# Patient Record
Sex: FEMALE | Race: WHITE | URBAN - METROPOLITAN AREA
[De-identification: names, ages, dates, MRNs, and addresses within clinical notes are randomized per-mention and may not be internally consistent; named-entity substitution may affect disease eponyms.]

---

## 2021-12-06 ENCOUNTER — HOSPITAL ENCOUNTER (OUTPATIENT)
Age: 26
Discharge: HOME | End: 2021-12-06
Attending: OBSTETRICS & GYNECOLOGY | Admitting: OBSTETRICS & GYNECOLOGY
Payer: MEDICARE

## 2021-12-06 VITALS
RESPIRATION RATE: 16 BRPM | TEMPERATURE: 98.1 F | HEART RATE: 76 BPM | SYSTOLIC BLOOD PRESSURE: 119 MMHG | DIASTOLIC BLOOD PRESSURE: 67 MMHG

## 2021-12-06 PROBLEM — Z34.93 NORMAL PREGNANCY IN THIRD TRIMESTER: Status: ACTIVE | Noted: 2021-12-06

## 2021-12-06 PROCEDURE — 99211 OFF/OP EST MAY X REQ PHY/QHP: CPT

## 2021-12-06 NOTE — PROGRESS NOTES
Patient presents to unit with co feeling her abd \"drop\" when she awakened this morning and decreased fetal movement since she awakened. At this time, patient perceives active fetal movement. Denies bleeding or leaking fluid or cramping. Plan of care reviewed.

## 2021-12-06 NOTE — H&P
Subjective:      Leonela Levy is an 31 y.o. female at 39 and 0/7 weeks gestation presenting with   Chief Complaint   Patient presents with    Decreased Fetal Movement   . She is also complaining of contractions every a few minutes lasting few seconds. Other associated symptoms include low back pain. Fetal Movement: normal.  No past medical history on file. Review of Systems  Pertinent items are noted in HPI. Objective:      /67   Pulse 76   Temp 98.1 °F (36.7 °C) (Oral)   Resp 16   Blood pressure 119/67, pulse 76, temperature 98.1 °F (36.7 °C), temperature source Oral, resp. rate 16. General:   alert, appears stated age and cooperative   Cervix:   closed. FHT:   135 BPM     Lab Review    CBC: No results found for: WBC, RBC, HGB, HCT, MCV, MCH, MCHC, RDW, PLT, MPV  CMP:  No results found for: NA, K, CL, CO2, BUN, CREATININE, GFRAA, AGRATIO, LABGLOM, GLUCOSE, PROT, LABALBU, CALCIUM, BILITOT, ALKPHOS, AST, ALT  U/A:  No results found for: NITRITE, COLORU, PHUR, LABCAST, WBCUA, RBCUA, MUCUS, TRICHOMONAS, YEAST, BACTERIA, CLARITYU, SPECGRAV, LEUKOCYTESUR, UROBILINOGEN, BILIRUBINUR, BLOODU, GLUCOSEU, AMORPHOUS       Assessment:   False labor    Plan:      Monitored for contractions - findings: uterine irritability and reactive strip. Orders: no treatment necessary.      Brianna Workman MD,MD,12/6/2021 5:10 PM

## 2022-01-04 ENCOUNTER — APPOINTMENT (OUTPATIENT)
Dept: LABOR AND DELIVERY | Age: 27
End: 2022-01-04
Payer: MEDICARE

## 2022-01-04 ENCOUNTER — HOSPITAL ENCOUNTER (OUTPATIENT)
Age: 27
Discharge: HOME | End: 2022-01-04
Attending: OBSTETRICS & GYNECOLOGY | Admitting: OBSTETRICS & GYNECOLOGY
Payer: MEDICARE

## 2022-01-04 VITALS
RESPIRATION RATE: 16 BRPM | DIASTOLIC BLOOD PRESSURE: 67 MMHG | TEMPERATURE: 98 F | SYSTOLIC BLOOD PRESSURE: 117 MMHG | HEART RATE: 82 BPM

## 2022-01-04 PROCEDURE — 59025 FETAL NON-STRESS TEST: CPT

## 2022-01-04 NOTE — PROGRESS NOTES
40w1d    Patient arrived to the unit for a scheduled NST for post dates. Patient denies leaking of fluids or bleeding and perceives fetal movement. Placed on EFM. Oriented to TR1. Call light in reach.

## 2022-01-11 ENCOUNTER — APPOINTMENT (OUTPATIENT)
Dept: LABOR AND DELIVERY | Age: 27
DRG: 560 | End: 2022-01-11
Payer: MEDICARE

## 2022-01-11 ENCOUNTER — HOSPITAL ENCOUNTER (OUTPATIENT)
Age: 27
Discharge: HOME | DRG: 560 | End: 2022-01-11
Attending: OBSTETRICS & GYNECOLOGY | Admitting: OBSTETRICS & GYNECOLOGY
Payer: MEDICARE

## 2022-01-11 PROCEDURE — 3E0P7VZ INTRODUCTION OF HORMONE INTO FEMALE REPRODUCTIVE, VIA NATURAL OR ARTIFICIAL OPENING: ICD-10-PCS | Performed by: OBSTETRICS & GYNECOLOGY

## 2022-01-11 PROCEDURE — 59025 FETAL NON-STRESS TEST: CPT

## 2022-01-11 NOTE — PROGRESS NOTES
G 1 P 0   41 week 1d  Pt of Dr. Darcy Walsh  Arrived ambulatory to unit for scheduled NST due to post date. Pt denies any bleeding or leaking of fluid, denies contractions. Pt stated that she perceives fetal movement and has not had any other complications with pregnancy. Test discussed with pt who verbalized understanding.

## 2022-01-12 ENCOUNTER — APPOINTMENT (OUTPATIENT)
Dept: LABOR AND DELIVERY | Age: 27
DRG: 560 | End: 2022-01-12
Payer: MEDICARE

## 2022-01-12 ENCOUNTER — HOSPITAL ENCOUNTER (INPATIENT)
Age: 27
LOS: 3 days | Discharge: HOME | DRG: 560 | End: 2022-01-15
Attending: OBSTETRICS & GYNECOLOGY | Admitting: OBSTETRICS & GYNECOLOGY
Payer: MEDICARE

## 2022-01-12 PROBLEM — O26.93 COMPLICATED PREGNANCY, THIRD TRIMESTER: Status: ACTIVE | Noted: 2022-01-12

## 2022-01-12 LAB
ABO/RH: NORMAL
ALBUMIN SERPL-MCNC: 4.1 G/DL (ref 3.5–5.2)
ALP BLD-CCNC: 195 U/L (ref 35–104)
ALT SERPL-CCNC: 8 U/L (ref 0–32)
AMPHETAMINE SCREEN, URINE: NOT DETECTED
ANION GAP SERPL CALCULATED.3IONS-SCNC: 14 MMOL/L (ref 7–16)
ANTIBODY SCREEN: NORMAL
AST SERPL-CCNC: 14 U/L (ref 0–31)
BARBITURATE SCREEN URINE: NOT DETECTED
BENZODIAZEPINE SCREEN, URINE: NOT DETECTED
BILIRUB SERPL-MCNC: 1 MG/DL (ref 0–1.2)
BUN BLDV-MCNC: 10 MG/DL (ref 6–20)
CALCIUM SERPL-MCNC: 9.1 MG/DL (ref 8.6–10.2)
CANNABINOID SCREEN URINE: NOT DETECTED
CHLORIDE BLD-SCNC: 101 MMOL/L (ref 98–107)
CO2: 20 MMOL/L (ref 22–29)
COCAINE METABOLITE SCREEN URINE: NOT DETECTED
CREAT SERPL-MCNC: 0.6 MG/DL (ref 0.5–1)
FENTANYL SCREEN, URINE: NOT DETECTED
GFR AFRICAN AMERICAN: >60
GFR NON-AFRICAN AMERICAN: >60 ML/MIN/1.73
GLUCOSE BLD-MCNC: 88 MG/DL (ref 74–99)
HCT VFR BLD CALC: 35.4 % (ref 34–48)
HEMOGLOBIN: 12 G/DL (ref 11.5–15.5)
Lab: NORMAL
MCH RBC QN AUTO: 29.9 PG (ref 26–35)
MCHC RBC AUTO-ENTMCNC: 33.9 % (ref 32–34.5)
MCV RBC AUTO: 88.3 FL (ref 80–99.9)
METHADONE SCREEN, URINE: NOT DETECTED
OPIATE SCREEN URINE: NOT DETECTED
OXYCODONE URINE: NOT DETECTED
PDW BLD-RTO: 13.9 FL (ref 11.5–15)
PHENCYCLIDINE SCREEN URINE: NOT DETECTED
PLATELET # BLD: 247 E9/L (ref 130–450)
PMV BLD AUTO: 11.7 FL (ref 7–12)
POTASSIUM SERPL-SCNC: 3.6 MMOL/L (ref 3.5–5)
RBC # BLD: 4.01 E12/L (ref 3.5–5.5)
SODIUM BLD-SCNC: 135 MMOL/L (ref 132–146)
TOTAL PROTEIN: 6.9 G/DL (ref 6.4–8.3)
WBC # BLD: 11.1 E9/L (ref 4.5–11.5)

## 2022-01-12 PROCEDURE — 6370000000 HC RX 637 (ALT 250 FOR IP)

## 2022-01-12 PROCEDURE — 80307 DRUG TEST PRSMV CHEM ANLYZR: CPT

## 2022-01-12 PROCEDURE — 36415 COLL VENOUS BLD VENIPUNCTURE: CPT

## 2022-01-12 PROCEDURE — 1220000001 HC SEMI PRIVATE L&D R&B

## 2022-01-12 PROCEDURE — 86850 RBC ANTIBODY SCREEN: CPT

## 2022-01-12 PROCEDURE — 3E033VJ INTRODUCTION OF OTHER HORMONE INTO PERIPHERAL VEIN, PERCUTANEOUS APPROACH: ICD-10-PCS | Performed by: OBSTETRICS & GYNECOLOGY

## 2022-01-12 PROCEDURE — 86900 BLOOD TYPING SEROLOGIC ABO: CPT

## 2022-01-12 PROCEDURE — 2580000003 HC RX 258: Performed by: OBSTETRICS & GYNECOLOGY

## 2022-01-12 PROCEDURE — 85027 COMPLETE CBC AUTOMATED: CPT

## 2022-01-12 PROCEDURE — 10907ZC DRAINAGE OF AMNIOTIC FLUID, THERAPEUTIC FROM PRODUCTS OF CONCEPTION, VIA NATURAL OR ARTIFICIAL OPENING: ICD-10-PCS | Performed by: OBSTETRICS & GYNECOLOGY

## 2022-01-12 PROCEDURE — 86901 BLOOD TYPING SEROLOGIC RH(D): CPT

## 2022-01-12 PROCEDURE — 80053 COMPREHEN METABOLIC PANEL: CPT

## 2022-01-12 RX ORDER — SODIUM CHLORIDE 0.9 % (FLUSH) 0.9 %
5-40 SYRINGE (ML) INJECTION EVERY 12 HOURS SCHEDULED
Status: DISCONTINUED | OUTPATIENT
Start: 2022-01-12 | End: 2022-01-13

## 2022-01-12 RX ORDER — SODIUM CHLORIDE, SODIUM LACTATE, POTASSIUM CHLORIDE, AND CALCIUM CHLORIDE .6; .31; .03; .02 G/100ML; G/100ML; G/100ML; G/100ML
1000 INJECTION, SOLUTION INTRAVENOUS PRN
Status: DISCONTINUED | OUTPATIENT
Start: 2022-01-12 | End: 2022-01-13

## 2022-01-12 RX ORDER — SODIUM CHLORIDE 0.9 % (FLUSH) 0.9 %
5-40 SYRINGE (ML) INJECTION PRN
Status: DISCONTINUED | OUTPATIENT
Start: 2022-01-12 | End: 2022-01-13

## 2022-01-12 RX ORDER — NALBUPHINE HCL 10 MG/ML
10 AMPUL (ML) INJECTION
Status: COMPLETED | OUTPATIENT
Start: 2022-01-12 | End: 2022-01-13

## 2022-01-12 RX ORDER — DOCUSATE SODIUM 100 MG/1
100 CAPSULE, LIQUID FILLED ORAL 2 TIMES DAILY
Status: DISCONTINUED | OUTPATIENT
Start: 2022-01-12 | End: 2022-01-13

## 2022-01-12 RX ORDER — SODIUM CHLORIDE, SODIUM LACTATE, POTASSIUM CHLORIDE, CALCIUM CHLORIDE 600; 310; 30; 20 MG/100ML; MG/100ML; MG/100ML; MG/100ML
INJECTION, SOLUTION INTRAVENOUS CONTINUOUS
Status: DISCONTINUED | OUTPATIENT
Start: 2022-01-12 | End: 2022-01-13

## 2022-01-12 RX ORDER — ONDANSETRON 2 MG/ML
4 INJECTION INTRAMUSCULAR; INTRAVENOUS EVERY 6 HOURS PRN
Status: DISCONTINUED | OUTPATIENT
Start: 2022-01-12 | End: 2022-01-13

## 2022-01-12 RX ORDER — SODIUM CHLORIDE, SODIUM LACTATE, POTASSIUM CHLORIDE, AND CALCIUM CHLORIDE .6; .31; .03; .02 G/100ML; G/100ML; G/100ML; G/100ML
500 INJECTION, SOLUTION INTRAVENOUS PRN
Status: DISCONTINUED | OUTPATIENT
Start: 2022-01-12 | End: 2022-01-13

## 2022-01-12 RX ORDER — SODIUM CHLORIDE 9 MG/ML
25 INJECTION, SOLUTION INTRAVENOUS PRN
Status: DISCONTINUED | OUTPATIENT
Start: 2022-01-12 | End: 2022-01-13

## 2022-01-12 RX ADMIN — SODIUM CHLORIDE, POTASSIUM CHLORIDE, SODIUM LACTATE AND CALCIUM CHLORIDE: 600; 310; 30; 20 INJECTION, SOLUTION INTRAVENOUS at 20:28

## 2022-01-12 RX ADMIN — Medication 25 MCG: at 21:00

## 2022-01-12 ASSESSMENT — PAIN SCALES - GENERAL: PAINLEVEL_OUTOF10: 0

## 2022-01-13 ENCOUNTER — ANESTHESIA EVENT (OUTPATIENT)
Dept: LABOR AND DELIVERY | Age: 27
DRG: 560 | End: 2022-01-13
Payer: MEDICARE

## 2022-01-13 ENCOUNTER — ANESTHESIA (OUTPATIENT)
Dept: LABOR AND DELIVERY | Age: 27
DRG: 560 | End: 2022-01-13
Payer: MEDICARE

## 2022-01-13 PROBLEM — Z34.93 NORMAL PREGNANCY IN THIRD TRIMESTER: Status: RESOLVED | Noted: 2021-12-06 | Resolved: 2022-01-13

## 2022-01-13 PROBLEM — O26.93 COMPLICATED PREGNANCY, THIRD TRIMESTER: Status: RESOLVED | Noted: 2022-01-12 | Resolved: 2022-01-13

## 2022-01-13 PROCEDURE — 99024 POSTOP FOLLOW-UP VISIT: CPT | Performed by: ADVANCED PRACTICE MIDWIFE

## 2022-01-13 PROCEDURE — 7200000001 HC VAGINAL DELIVERY

## 2022-01-13 PROCEDURE — 62327 NJX INTERLAMINAR LMBR/SAC: CPT | Performed by: ANESTHESIOLOGY

## 2022-01-13 PROCEDURE — 2580000003 HC RX 258: Performed by: OBSTETRICS & GYNECOLOGY

## 2022-01-13 PROCEDURE — 6370000000 HC RX 637 (ALT 250 FOR IP): Performed by: OBSTETRICS & GYNECOLOGY

## 2022-01-13 PROCEDURE — 51702 INSERT TEMP BLADDER CATH: CPT

## 2022-01-13 PROCEDURE — 0KQM0ZZ REPAIR PERINEUM MUSCLE, OPEN APPROACH: ICD-10-PCS | Performed by: OBSTETRICS & GYNECOLOGY

## 2022-01-13 PROCEDURE — 1220000001 HC SEMI PRIVATE L&D R&B

## 2022-01-13 PROCEDURE — 2500000003 HC RX 250 WO HCPCS

## 2022-01-13 PROCEDURE — 6360000002 HC RX W HCPCS: Performed by: OBSTETRICS & GYNECOLOGY

## 2022-01-13 RX ORDER — DOCUSATE SODIUM 100 MG/1
100 CAPSULE, LIQUID FILLED ORAL 2 TIMES DAILY
Status: DISCONTINUED | OUTPATIENT
Start: 2022-01-13 | End: 2022-01-15 | Stop reason: HOSPADM

## 2022-01-13 RX ORDER — ONDANSETRON 2 MG/ML
4 INJECTION INTRAMUSCULAR; INTRAVENOUS EVERY 6 HOURS PRN
Status: DISCONTINUED | OUTPATIENT
Start: 2022-01-13 | End: 2022-01-13

## 2022-01-13 RX ORDER — SODIUM CHLORIDE 0.9 % (FLUSH) 0.9 %
5-40 SYRINGE (ML) INJECTION EVERY 12 HOURS SCHEDULED
Status: DISCONTINUED | OUTPATIENT
Start: 2022-01-13 | End: 2022-01-15 | Stop reason: HOSPADM

## 2022-01-13 RX ORDER — SODIUM CHLORIDE 9 MG/ML
25 INJECTION, SOLUTION INTRAVENOUS PRN
Status: DISCONTINUED | OUTPATIENT
Start: 2022-01-13 | End: 2022-01-15 | Stop reason: HOSPADM

## 2022-01-13 RX ORDER — IBUPROFEN 800 MG/1
800 TABLET ORAL EVERY 6 HOURS PRN
Status: DISCONTINUED | OUTPATIENT
Start: 2022-01-13 | End: 2022-01-15 | Stop reason: HOSPADM

## 2022-01-13 RX ORDER — NALBUPHINE HCL 10 MG/ML
5 AMPUL (ML) INJECTION EVERY 4 HOURS PRN
Status: DISCONTINUED | OUTPATIENT
Start: 2022-01-13 | End: 2022-01-13

## 2022-01-13 RX ORDER — LIDOCAINE HYDROCHLORIDE 10 MG/ML
INJECTION, SOLUTION EPIDURAL; INFILTRATION; INTRACAUDAL; PERINEURAL
Status: DISCONTINUED
Start: 2022-01-13 | End: 2022-01-13

## 2022-01-13 RX ORDER — FERROUS SULFATE 325(65) MG
325 TABLET ORAL 2 TIMES DAILY WITH MEALS
Status: DISCONTINUED | OUTPATIENT
Start: 2022-01-14 | End: 2022-01-15 | Stop reason: HOSPADM

## 2022-01-13 RX ORDER — OXYCODONE HYDROCHLORIDE AND ACETAMINOPHEN 5; 325 MG/1; MG/1
2 TABLET ORAL EVERY 4 HOURS PRN
Status: DISCONTINUED | OUTPATIENT
Start: 2022-01-13 | End: 2022-01-15 | Stop reason: HOSPADM

## 2022-01-13 RX ORDER — SODIUM CHLORIDE 0.9 % (FLUSH) 0.9 %
5-40 SYRINGE (ML) INJECTION PRN
Status: DISCONTINUED | OUTPATIENT
Start: 2022-01-13 | End: 2022-01-15 | Stop reason: HOSPADM

## 2022-01-13 RX ORDER — ONDANSETRON 4 MG/1
8 TABLET, ORALLY DISINTEGRATING ORAL EVERY 8 HOURS PRN
Status: DISCONTINUED | OUTPATIENT
Start: 2022-01-13 | End: 2022-01-15 | Stop reason: HOSPADM

## 2022-01-13 RX ORDER — MODIFIED LANOLIN
OINTMENT (GRAM) TOPICAL PRN
Status: DISCONTINUED | OUTPATIENT
Start: 2022-01-13 | End: 2022-01-15 | Stop reason: HOSPADM

## 2022-01-13 RX ORDER — EPHEDRINE SULFATE/0.9% NACL/PF 50 MG/5 ML
5 SYRINGE (ML) INTRAVENOUS PRN
Status: DISCONTINUED | OUTPATIENT
Start: 2022-01-13 | End: 2022-01-13

## 2022-01-13 RX ORDER — OXYCODONE HYDROCHLORIDE AND ACETAMINOPHEN 5; 325 MG/1; MG/1
1 TABLET ORAL EVERY 4 HOURS PRN
Status: DISCONTINUED | OUTPATIENT
Start: 2022-01-13 | End: 2022-01-15 | Stop reason: HOSPADM

## 2022-01-13 RX ORDER — NALOXONE HYDROCHLORIDE 0.4 MG/ML
0.4 INJECTION, SOLUTION INTRAMUSCULAR; INTRAVENOUS; SUBCUTANEOUS PRN
Status: DISCONTINUED | OUTPATIENT
Start: 2022-01-13 | End: 2022-01-13

## 2022-01-13 RX ORDER — ACETAMINOPHEN 325 MG/1
650 TABLET ORAL EVERY 4 HOURS PRN
Status: DISCONTINUED | OUTPATIENT
Start: 2022-01-13 | End: 2022-01-15 | Stop reason: HOSPADM

## 2022-01-13 RX ADMIN — SODIUM CHLORIDE, POTASSIUM CHLORIDE, SODIUM LACTATE AND CALCIUM CHLORIDE: 600; 310; 30; 20 INJECTION, SOLUTION INTRAVENOUS at 03:03

## 2022-01-13 RX ADMIN — Medication 2 MILLI-UNITS/MIN: at 09:30

## 2022-01-13 RX ADMIN — SODIUM CHLORIDE, POTASSIUM CHLORIDE, SODIUM LACTATE AND CALCIUM CHLORIDE: 600; 310; 30; 20 INJECTION, SOLUTION INTRAVENOUS at 17:55

## 2022-01-13 RX ADMIN — IBUPROFEN 800 MG: 800 TABLET, FILM COATED ORAL at 22:01

## 2022-01-13 RX ADMIN — SODIUM CHLORIDE, POTASSIUM CHLORIDE, SODIUM LACTATE AND CALCIUM CHLORIDE: 600; 310; 30; 20 INJECTION, SOLUTION INTRAVENOUS at 12:15

## 2022-01-13 RX ADMIN — NALBUPHINE HYDROCHLORIDE 10 MG: 10 INJECTION, SOLUTION INTRAMUSCULAR; INTRAVENOUS; SUBCUTANEOUS at 05:02

## 2022-01-13 RX ADMIN — SODIUM CHLORIDE, POTASSIUM CHLORIDE, SODIUM LACTATE AND CALCIUM CHLORIDE: 600; 310; 30; 20 INJECTION, SOLUTION INTRAVENOUS at 11:12

## 2022-01-13 RX ADMIN — DOCUSATE SODIUM 100 MG: 100 CAPSULE ORAL at 22:01

## 2022-01-13 RX ADMIN — NALBUPHINE HYDROCHLORIDE 10 MG: 10 INJECTION, SOLUTION INTRAMUSCULAR; INTRAVENOUS; SUBCUTANEOUS at 09:30

## 2022-01-13 RX ADMIN — Medication 25 MCG: at 05:02

## 2022-01-13 RX ADMIN — Medication 25 MCG: at 01:01

## 2022-01-13 ASSESSMENT — PAIN SCALES - GENERAL
PAINLEVEL_OUTOF10: 4
PAINLEVEL_OUTOF10: 6
PAINLEVEL_OUTOF10: 3
PAINLEVEL_OUTOF10: 4

## 2022-01-13 ASSESSMENT — PAIN DESCRIPTION - PROGRESSION
CLINICAL_PROGRESSION: GRADUALLY WORSENING
CLINICAL_PROGRESSION: GRADUALLY WORSENING

## 2022-01-13 ASSESSMENT — PAIN DESCRIPTION - ONSET
ONSET: ON-GOING
ONSET: ON-GOING

## 2022-01-13 ASSESSMENT — PAIN DESCRIPTION - DESCRIPTORS
DESCRIPTORS: CRAMPING;DISCOMFORT
DESCRIPTORS: ACHING;DISCOMFORT
DESCRIPTORS: CRAMPING
DESCRIPTORS: CRAMPING
DESCRIPTORS: CRAMPING;DISCOMFORT

## 2022-01-13 ASSESSMENT — PAIN DESCRIPTION - LOCATION
LOCATION: ABDOMEN
LOCATION: ABDOMEN

## 2022-01-13 ASSESSMENT — PAIN DESCRIPTION - FREQUENCY
FREQUENCY: INTERMITTENT
FREQUENCY: INTERMITTENT

## 2022-01-13 ASSESSMENT — PAIN DESCRIPTION - ORIENTATION
ORIENTATION: LOWER;MID
ORIENTATION: LOWER;MID

## 2022-01-13 ASSESSMENT — PAIN DESCRIPTION - PAIN TYPE
TYPE: ACUTE PAIN
TYPE: ACUTE PAIN

## 2022-01-13 NOTE — PROGRESS NOTES
RN assuming care of patient. States she is feeling mild contractions and slightly uncomfortable with pain intensity 3/10. States IV Nubain given earlier was helpful. Assisted up to bathroom. Voiding QS with no difficulties. Returned to bed. EFM reapplied. IVF's patent and infusing well. VSS. Support person at bedside. Call bell in reach.

## 2022-01-13 NOTE — ANESTHESIA PRE PROCEDURE
Department of Anesthesiology  Preprocedure Note       Name:  Ar Granados   Age:  31 y.o.  :  1995                                          MRN:  [id number]         Date:  2022      Surgeon: * No surgeons listed *    Procedure: Labor Options    Medications prior to admission:   Prior to Admission medications    Medication Sig Start Date End Date Taking? Authorizing Provider   [id number]-H2-R0-P60-Z8-HH (PRENA1 PO) Take 1 tablet by mouth daily    Historical Provider, MD       Current medications:    No current facility-administered medications for this visit. No current outpatient medications on file.      Facility-Administered Medications Ordered in Other Visits   Medication Dose Route Frequency Provider Last Rate Last Admin    lidocaine PF 1 % injection             oxytocin (PITOCIN) 30 units in 500 mL infusion  1 levar-units/min IntraVENous Continuous Amine JANELL Workman MD 4 mL/hr at 22 1500 4 levar-units/min at 22 1500    ePHEDrine injection 5 mg  5 mg IntraVENous PRN Veronica Cotton MD        fentaNYL 1.85mcg/ml and bupivacaine 0.1% 15ml syringe (Home Care) (OB) 15 mL epidural  15 mL Epidural Once Veronica Cotton MD        fentaNYL 1.85mcg/ml and Bupivicaine 0.1% in 0.9% NS 135ml infusion (OB) epidural  15 mL/hr Epidural Continuous Veroinca Cotton MD        [facility]) injection 0.4 mg  0.4 mg IntraVENous PRN Veronica Cotton MD        nalbuphine (NUBAIN) injection 5 mg  5 mg IntraVENous Q4H PRN Veronica Cotton MD        ondansetron [facility]) injection 4 mg  4 mg IntraVENous Q6H PRN Veronica Cotton MD        lactated ringers infusion   IntraVENous Continuous Amine JANELL Workman  mL/hr at 22 1215 New Bag at 22 1215    lactated ringers bolus  500 mL IntraVENous PRN Brianna Workman MD        Or    lactated ringers bolus  1,000 mL IntraVENous PRN Brianna Workman MD        sodium chloride flush 0.9 % injection 5-40 mL  5-40 mL IntraVENous 2 times per day Miya Conley MD  sodium chloride flush 0.9 % injection 5-40 mL  5-40 mL IntraVENous PRN Amine JANELL Workman MD        0.9 % sodium chloride infusion  25 mL IntraVENous PRN Amine JANELL Workman MD        oxytocin (PITOCIN) 30 units in 500 mL infusion  87.3 levar-units/min IntraVENous Continuous PRN Amine JANELL Workman MD        And    oxytocin (PITOCIN) 30 units in 500 mL infusion  909 levar-units/min IntraVENous PRN Brianna Workman MD        ondansetron (ZOFRAN) injection 4 mg  4 mg IntraVENous Q6H PRN Brianna Workman MD        docusate sodium (COLACE) capsule 100 mg  100 mg Oral BID Brianna Workman MD        miSOPROStol (CYTOTEC) pre-split tablet TABS 25 mcg  25 mcg Vaginal Q4H Brianna Workman MD   25 mcg at 01/13/22 0502       Allergies:  No Known Allergies    Problem List:    Patient Active Problem List   Diagnosis Code    Normal pregnancy in third trimester N67.05    Complicated pregnancy, third trimester O26.93    Pregnancy with 41 completed weeks gestation Z3A.41    Encounter for elective induction of labor Z34.90       Past Medical History:  No past medical history on file. Past Surgical History:        Procedure Laterality Date    APPENDECTOMY         Social History:    Social History     Tobacco Use    Smoking status: Former Smoker    Smokeless tobacco: Never Used   Substance Use Topics    Alcohol use: Not Currently                                Counseling given: Not Answered      Vital Signs (Current): There were no vitals filed for this visit.                                            BP Readings from Last 3 Encounters:   01/13/22 121/75   01/07/22 113/69   01/04/22 117/67       NPO Status:                                                                                 BMI:   Wt Readings from Last 3 Encounters:   01/12/22 173 lb (78.5 kg)     There is no height or weight on file to calculate BMI.    CBC:   Lab Results   Component Value Date    WBC 11.1 01/12/2022    RBC 4.01 01/12/2022 HGB 12.0 2022    HCT 35.4 2022    MCV 88.3 2022    RDW 13.9 2022     2022       CMP:   Lab Results   Component Value Date     2022    K 3.6 2022     2022    CO2 20 2022    BUN 10 2022    CREATININE 0.6 2022    GFRAA >60 2022    LABGLOM >60 2022    GLUCOSE 88 2022    PROT 6.9 2022    CALCIUM 9.1 2022    BILITOT 1.0 2022    ALKPHOS 195 2022    AST 14 2022    ALT 8 2022       POC Tests: No results for input(s): POCGLU, POCNA, POCK, POCCL, POCBUN, POCHEMO, POCHCT in the last 72 hours. Coags: No results found for: PROTIME, INR, APTT    HCG (If Applicable): No results found for: PREGTESTUR, PREGSERUM, HCG, HCGQUANT     ABGs: No results found for: PHART, PO2ART, OBD3CAY, AUO4BMM, BEART, I6PXEPKI     Type & Screen (If Applicable):  No results found for: LABABO, LABRH    Drug/Infectious Status (If Applicable):  No results found for: HIV, HEPCAB    COVID-19 Screening (If Applicable): No results found for: COVID19        Anesthesia Evaluation     Anesthesia Plan        Les Shea MD   2022        Department of Anesthesiology  Preprocedure Note       Name:  Bernerd Halsted   Age:  31 y.o.  :  1995                                          MRN:  [id number]         Date:  2022      Surgeon: * No surgeons listed *    Procedure: * No procedures listed *    Medications prior to admission:   Prior to Admission medications    Medication Sig Start Date End Date Taking?  Authorizing Provider   [id number]-L8-[id number]-B6-[id number] (PRENA1 PO) Take 1 tablet by mouth daily   Yes Historical Provider, MD       Current medications:    Current Facility-Administered Medications   Medication Dose Route Frequency Provider Last Rate Last Admin    lidocaine PF 1 % injection             oxytocin (PITOCIN) 30 units in 500 mL infusion  1 levar-units/min IntraVENous Continuous Amine JANELL Workman MD 8 mL/hr at 01/13/22 1130 8 levar-units/min at 01/13/22 1130    ePHEDrine injection 5 mg  5 mg IntraVENous PRN Veronica Cotton MD        fentaNYL 1.85mcg/ml and bupivacaine 0.1% 15ml syringe (Home Care) (OB) 15 mL epidural  15 mL Epidural Once Veronica Cotton MD        fentaNYL 1.85mcg/ml and Bupivicaine 0.1% in 0.9% NS 135ml infusion (OB) epidural  15 mL/hr Epidural Continuous Veronica Cotton MD        naloxone [facility]) injection 0.4 mg  0.4 mg IntraVENous PRN Veronica Cotton MD        nalbuphine (NUBAIN) injection 5 mg  5 mg IntraVENous Q4H PRN Veronica Cotton MD        [facility]) injection 4 mg  4 mg IntraVENous Q6H PRN Veronica Cotton MD        lactated ringers infusion   IntraVENous Continuous Amine JANELL Workman  mL/hr at 01/13/22 1112 New Bag at 01/13/22 1112    lactated ringers bolus  500 mL IntraVENous PRN Brianna Workman MD        Or    lactated ringers bolus  1,000 mL IntraVENous PRN Amine JANELL Workman MD        sodium chloride flush 0.9 % injection 5-40 mL  5-40 mL IntraVENous 2 times per day Amine JANELL Workman MD        sodium chloride flush 0.9 % injection 5-40 mL  5-40 mL IntraVENous PRN Brianna Workman MD        0.9 % sodium chloride infusion  25 mL IntraVENous PRN Brianna Workman MD        oxytocin (PITOCIN) 30 units in 500 mL infusion  87.3 levar-units/min IntraVENous Continuous PRN Amine JANELL Workman MD        And    oxytocin (PITOCIN) 30 units in 500 mL infusion  909 levar-units/min IntraVENous PRN Brianna Workman MD        ondansetron (ZOFRAN) injection 4 mg  4 mg IntraVENous Q6H PRN Brianna Workman MD        docusate sodium (COLACE) capsule 100 mg  100 mg Oral BID Brianna Workman MD        miSOPROStol (CYTOTEC) pre-split tablet TABS 25 mcg  25 mcg Vaginal Q4H Brianna Workman MD   25 mcg at 01/13/22 0502       Allergies:  No Known Allergies    Problem List:    Patient Active Problem List   Diagnosis Code    Normal pregnancy in third trimester V28.15    Complicated pregnancy, third trimester O26.93    Pregnancy with 41 completed weeks gestation Z3A.41    Encounter for elective induction of labor Z34.90       Past Medical History:  No past medical history on file. Past Surgical History:        Procedure Laterality Date    APPENDECTOMY         Social History:    Social History     Tobacco Use    Smoking status: Former Smoker    Smokeless tobacco: Never Used   Substance Use Topics    Alcohol use: Not Currently                                Counseling given: Not Answered      Vital Signs (Current):   Vitals:    01/13/22 1030 01/13/22 1037 01/13/22 1100 01/13/22 1134   BP: 115/61  111/75 117/79   Pulse: 77  85 81   Resp: 18  18    Temp: 36.8 °C (98.2 °F)      TempSrc: Oral      SpO2:  98%     Weight:       Height:                                                  BP Readings from Last 3 Encounters:   01/13/22 117/79   01/07/22 113/69   01/04/22 117/67       NPO Status:                                                                                 BMI:   Wt Readings from Last 3 Encounters:   01/12/22 173 lb (78.5 kg)     Body mass index is 27.1 kg/m². CBC:   Lab Results   Component Value Date    WBC 11.1 01/12/2022    RBC 4.01 01/12/2022    HGB 12.0 01/12/2022    HCT 35.4 01/12/2022    MCV 88.3 01/12/2022    RDW 13.9 01/12/2022     01/12/2022       CMP:   Lab Results   Component Value Date     01/12/2022    K 3.6 01/12/2022     01/12/2022    CO2 20 01/12/2022    BUN 10 01/12/2022    CREATININE 0.6 01/12/2022    GFRAA >60 01/12/2022    LABGLOM >60 01/12/2022    GLUCOSE 88 01/12/2022    PROT 6.9 01/12/2022    CALCIUM 9.1 01/12/2022    BILITOT 1.0 01/12/2022    ALKPHOS 195 01/12/2022    AST 14 01/12/2022    ALT 8 01/12/2022       POC Tests: No results for input(s): POCGLU, POCNA, POCK, POCCL, POCBUN, POCHEMO, POCHCT in the last 72 hours.     Coags: No results found for: PROTIME, INR, APTT    HCG (If Applicable): No results found for: PREGTESTUR, PREGSERUM, HCG, HCGQUANT     ABGs: No results found for: PHART, PO2ART, KRL1UKB, ECL3PNT, BEART, M1SNMCMS     Type & Screen (If Applicable):  No results found for: LABABO, LABRH    Drug/Infectious Status (If Applicable):  No results found for: HIV, HEPCAB    COVID-19 Screening (If Applicable): No results found for: COVID19        Anesthesia Evaluation  Patient summary reviewed no history of anesthetic complications:   Airway: Mallampati: II  TM distance: >3 FB   Neck ROM: full  Mouth opening: > = 3 FB Dental: normal exam         Pulmonary:Negative Pulmonary ROS breath sounds clear to auscultation                             Cardiovascular:Negative CV ROS            Rhythm: regular  Rate: normal                    Neuro/Psych:   Negative Neuro/Psych ROS              GI/Hepatic/Renal: Neg GI/Hepatic/Renal ROS       (-) no morbid obesity       Endo/Other: Negative Endo/Other ROS                    Abdominal:         (-) obese       Vascular: negative vascular ROS. Other Findings:             Anesthesia Plan      epidural     ASA 2             Anesthetic plan and risks discussed with patient. Plan discussed with attending.                   TERESITA Mcqueen - CRNA   1/13/2022      Electronically signed by Danielle Henry MD on January 13, 2022 at 5:38 PM.

## 2022-01-13 NOTE — PROGRESS NOTES
Resting comfortably with epidural now and denies further pain with contractions. Clear liquids given. Support person attentive at bedside.

## 2022-01-13 NOTE — PROGRESS NOTES
Dr Sharma Grew at bedside, visualized fetal tracing, plan of care discussed with patient. Patient verbalizes understanding with no further questions at this time.

## 2022-01-13 NOTE — PROGRESS NOTES
Moaning with c/o discomfort of contractions becoming stronger once again. Informed patient we will be examining her soon.  IV Nubain offered but awaiting vaginal exam done first

## 2022-01-13 NOTE — PROGRESS NOTES
Patient is a 31year old , EDC 2022 41.2 weeks. She presented ambulatory to the Labor and delivery unit for a scheduled iol. Patient denies any vaginal bleeding or leakage of fluid and reports normal fetal movement.   Patient taken to LDr 12 EFM applied and plan of care discussed

## 2022-01-13 NOTE — PROGRESS NOTES
notified in office of hyperstimulation of contractions and that Pitocin rate was halved. Informed of patients recent vaginal exam and states he will be here to evaluate patient further once he returns to hospital-no new orders at this time.

## 2022-01-13 NOTE — H&P
Department of Obstetrics and Gynecology  [facility] History and Physical        CHIEF COMPLAINT:  IOL      HISTORY OF PRESENT ILLNESS:      The patient is a 31 y.o. female , No LMP recorded. Patient is pregnant. ,  at [address] [place] [address]  OB History        1    Para        Term                AB        Living           SAB        IAB        Ectopic        Molar        Multiple        Live Births                Patient presents for IOL received 3 doses Cytotec made cervical change and Pitocin is currently infusing. FOB at bedside pt reports Nubain no longer helping with pain desires epidural at this time. Estimated Due Date: Estimated Date of Delivery: 1/3/22    PRENATAL CARE:    Complicated by:   Patient Active Problem List   Diagnosis Code    Normal pregnancy in third trimester X41.13    Complicated pregnancy, third trimester O26.93       PAST OB HISTORY  OB History        1    Para        Term                AB        Living           SAB        IAB        Ectopic        Molar        Multiple        Live Births                    Past Medical History:    No past medical history on file. Past Surgical History:        Procedure Laterality Date    APPENDECTOMY       Social History:    TOBACCO:   reports that she has quit smoking. She has never used smokeless tobacco.  ETOH:   reports previous alcohol use. DRUGS:   reports no history of drug use. Family History:   No family history on file. Medications Prior to Admission:  Medications Prior to Admission: Fdufoi-X3-J6-G62-W6-RR (PRENA1 PO), Take 1 tablet by mouth daily  Allergies:  Patient has no known allergies.     Review of Systems:   Ears, nose, mouth, throat, and face: negative  Respiratory: negative  Cardiovascular: negative  Gastrointestinal: negative  Genitourinary:negative  Integument/breast: negative  Hematologic/lymphatic: negative  Musculoskeletal:negative  Neurological: negative  Behavioral/Psych: negative  Endocrine: negative  Allergic/Immunologic: negative  Psychosocial: negative    PHYSICAL EXAM:    General appearance:  awake, alert, cooperative, no apparent distress, and appears stated age  Neurologic:  Awake, alert, oriented to name, place and time. Cranial nerves II-XII are grossly intact. and gait is normal.  Lungs:  No increased work of breathing, good air exchange  Heart:  regular rate and rhythm   Abdomen:  Gravid with normal bowel sounds, soft, non-distended, non-tender, no masses palpated  Pelvis:  External Genitalia: General appearance; normal, Hair distribution; normal, Lesions absent.     Fetal heart rate: Category 1   Cervix:    DILATION:  2  EFFACEMENT:   70  STATION:  -2    Contraction frequency:  2-3  Membranes:  Intact     /75   Pulse 85   Temp 98.2 °F (36.8 °C) (Oral)   Resp 18   Ht 5' 7\" (1.702 m)   Wt 173 lb (78.5 kg)   SpO2 98%   BMI 27.10 kg/m²     General Labs:    Lab Results   Component Value Date    WBC 11.1 2022    HGB 12.0 2022    HCT 35.4 2022    MCV 88.3 2022     2022      Lab Results   Component Value Date     2022    K 3.6 2022     2022    CO2 20 (L) 2022    BUN 10 2022    CREATININE 0.6 2022    GLUCOSE 88 2022    CALCIUM 9.1 2022    PROT 6.9 2022    LABALBU 4.1 2022    BILITOT 1.0 2022    ALKPHOS 195 (H) 2022    AST 14 2022    ALT 8 2022    LABGLOM >60 2022    GFRAA >60 2022       Prenatal labs:  - HIV: negative  - HBsAg: negative  - Hepatits C: negative  - RPR: negative  - Chlamydia: negative  - GC: negative  - HSV: not reported  - Rubella: immune  - GBS: negative  - UDS: PENDING      ASSESSMENT AND PLAN:  IUP 41 3/7  FHR category 1     IOL     Admit   Continue Pitocin   Epidural now    Electronically signed by TERESITA Ortega CNM on 2022 at 11:15 AM

## 2022-01-13 NOTE — PROGRESS NOTES
in at bedside to see patient. Orders to continue Cytotec induction but on next exam notify him if patient is 2cm.  Patient agreeable to plan of care

## 2022-01-13 NOTE — ANESTHESIA PROCEDURE NOTES
Epidural Block    Patient location during procedure: OB  Start time: 1/13/2022 11:55 AM  End time: 1/13/2022 12:10 PM  Reason for block: labor epidural  Staffing  Performed: resident/CRNA   Anesthesiologist: Andressa Roberson MD  Resident/CRNA: TERESITA Ramirez CRNA  Preanesthetic Checklist  Completed: patient identified, IV checked, site marked, risks and benefits discussed, surgical consent, monitors and equipment checked, pre-op evaluation, timeout performed, anesthesia consent given, oxygen available and patient being monitored  Epidural  Patient position: sitting  Prep: Betadine and site prepped and draped  Patient monitoring: cardiac monitor, continuous pulse ox and frequent blood pressure checks  Approach: midline  Location: lumbar (1-5)  Injection technique: AMANDA saline  Guidance: paresthesia technique  Provider prep: mask and sterile gloves  Needle  Needle type: Tuohy   Needle gauge: 17 G  Needle length: 3.5 in  Needle insertion depth: 5 cm  Catheter type: end hole  Catheter size: 19 G  Catheter at skin depth: 10 cm  Test dose: negative  Assessment  Sensory level: T6  Hemodynamics: stable  Attempts: 1

## 2022-01-13 NOTE — PROGRESS NOTES
Resting comfortably Rt. Side  with eyes closed. Reactive FHR tracing observed. Contractions regular now-no hyperstimulation observed.

## 2022-01-13 NOTE — PROGRESS NOTES
IV Pitocin induction started per Dr's orders with RN assessing FHR and contractions [id number] while Pitocin infusing in first stage of labor.

## 2022-01-14 LAB
HCT VFR BLD CALC: 30.9 % (ref 34–48)
HEMOGLOBIN: 10.4 G/DL (ref 11.5–15.5)

## 2022-01-14 PROCEDURE — 36415 COLL VENOUS BLD VENIPUNCTURE: CPT

## 2022-01-14 PROCEDURE — 85018 HEMOGLOBIN: CPT

## 2022-01-14 PROCEDURE — 85014 HEMATOCRIT: CPT

## 2022-01-14 PROCEDURE — 6370000000 HC RX 637 (ALT 250 FOR IP): Performed by: OBSTETRICS & GYNECOLOGY

## 2022-01-14 PROCEDURE — 1220000001 HC SEMI PRIVATE L&D R&B

## 2022-01-14 RX ORDER — IBUPROFEN 800 MG/1
800 TABLET ORAL EVERY 6 HOURS PRN
Qty: 120 TABLET | Refills: 0 | Status: SHIPPED | OUTPATIENT
Start: 2022-01-14

## 2022-01-14 RX ADMIN — DOCUSATE SODIUM 100 MG: 100 CAPSULE ORAL at 09:05

## 2022-01-14 RX ADMIN — IBUPROFEN 800 MG: 800 TABLET, FILM COATED ORAL at 23:29

## 2022-01-14 RX ADMIN — IBUPROFEN 800 MG: 800 TABLET, FILM COATED ORAL at 09:08

## 2022-01-14 RX ADMIN — DOCUSATE SODIUM 100 MG: 100 CAPSULE ORAL at 20:55

## 2022-01-14 RX ADMIN — IBUPROFEN 800 MG: 800 TABLET, FILM COATED ORAL at 15:22

## 2022-01-14 ASSESSMENT — PAIN DESCRIPTION - ORIENTATION: ORIENTATION: LOWER;MID

## 2022-01-14 ASSESSMENT — PAIN SCALES - GENERAL
PAINLEVEL_OUTOF10: 3
PAINLEVEL_OUTOF10: 0
PAINLEVEL_OUTOF10: 2

## 2022-01-14 ASSESSMENT — PAIN DESCRIPTION - FREQUENCY: FREQUENCY: INTERMITTENT

## 2022-01-14 ASSESSMENT — PAIN DESCRIPTION - PROGRESSION: CLINICAL_PROGRESSION: GRADUALLY IMPROVING

## 2022-01-14 ASSESSMENT — PAIN DESCRIPTION - LOCATION: LOCATION: ABDOMEN

## 2022-01-14 ASSESSMENT — PAIN DESCRIPTION - PAIN TYPE: TYPE: ACUTE PAIN

## 2022-01-14 ASSESSMENT — PAIN DESCRIPTION - DESCRIPTORS: DESCRIPTORS: CRAMPING

## 2022-01-14 ASSESSMENT — PAIN DESCRIPTION - ONSET: ONSET: ON-GOING

## 2022-01-14 NOTE — ANESTHESIA POSTPROCEDURE EVALUATION
Department of Anesthesiology  Postprocedure Note    Patient: Jose Armando Levy  MRN: [id number]  YOB: 1995  Date of evaluation: 1/14/2022  Time:  3:33 PM     Procedure Summary     Date: 01/13/22 Room / Location:     Anesthesia Start: 6990 Anesthesia Stop: 1950    Procedure: Labor Analgesia Diagnosis:     Scheduled Providers:  Responsible Provider: Seven Argueta MD    Anesthesia Type: epidural ASA Status: 2          Anesthesia Type: epidural    Tyson Phase I: Tyson Score: 10    Tyson Phase II: Tyson Score: 10    Last vitals: Reviewed and per EMR flowsheets.        Anesthesia Post Evaluation    Patient location during evaluation: bedside  Patient participation: complete - patient participated  Level of consciousness: awake and alert  Airway patency: patent  Nausea & Vomiting: no nausea and no vomiting  Complications: no  Cardiovascular status: hemodynamically stable  Respiratory status: acceptable  Hydration status: euvolemic  Comments: Patient satisfied with epidural for labor

## 2022-01-14 NOTE — PROGRESS NOTES
Bedside report received from previous RN, Assumed patient care, first patient contact.   Plan of care for shift discussed with patient, patient verbalizes understanding with no further questions at this time

## 2022-01-14 NOTE — PROGRESS NOTES
Hearing screening results were discussed with parent. Questions answered. Brochure given to parent. Advised to monitor developmental milestones and contact physician for any concerns.    Electronically signed by Lam Castro on 1/14/2022 at 9:56 AM/COOKIE

## 2022-01-14 NOTE — PROGRESS NOTES
Department of Obstetrics and Gynecology  Labor and Delivery  Attending Post Partum Progress Note      SUBJECTIVE:  Doing well with no complaints  OBJECTIVE:      Vitals:  /72   Pulse 78   Temp 98.1 °F (36.7 °C) (Oral)   Resp 16   Ht 5' 7\" (1.702 m)   Wt 173 lb (78.5 kg)   SpO2 99%   Breastfeeding Unknown   BMI 27.10 kg/m²     ABDOMEN:  Soft, well contracted uterus   Lochia: Normal  No calf tenderness    DATA:    CBC:    Lab Results   Component Value Date    WBC 11.1 01/12/2022    RBC 4.01 01/12/2022    HGB 10.4 01/14/2022    HCT 30.9 01/14/2022    MCV 88.3 01/12/2022    RDW 13.9 01/12/2022     01/12/2022       ASSESSMENT & PLAN:      PPD # 1    Continue current care. Discharge home tomorrow.

## 2022-01-14 NOTE — L&D DELIVERY NOTE
Department of Obstetrics and Gynecology  Spontaneous Vaginal Delivery Note         Pre-operative Diagnosis:  Term pregnancy, Induced labor, Single fetus and Pregnancy complicated by: post dates    Post-operative Diagnosis:  Same + live male wt 8 #,2 oz  Procedure:  Spontaneous vaginal delivery or Repair second degree spontaneous laceration    Surgeon:  Nu Silva MD    Anesthesia:  epidural anesthesia    Estimated blood loss:  300ml    Specimen:  Placenta not sent to pathology     Cord blood sent Yes    Complications:  none    Condition:  infant stable to general nursery and mother stable    Details of Procedure: The patient is a 31 y.o. female at 45w2d   OB History        1    Para        Term                AB        Living           SAB        IAB        Ectopic        Molar        Multiple        Live Births                 who was admitted for induction. She received the following interventions: ARBOW, vaginal Cytotec and IV Pitocin induction She was known to be GBS negative and did not receive antibiotic prophylaxis. The patient progressed well,did receive an epidural, became complete and started to push. After pushing for 20 minutes, the fetal head was at the perineum, nose and mouth suctioned with bulb suction and the rest of the infant delivered atraumatically, placed on mother abdomen. Cord was clamped and cut and infant handed off to the waiting nurse for evaluation. The delivery of the placenta was spontaneous. The perineum and vagina were explored and a second degree laceration was repaired in standard fashion.

## 2022-01-15 VITALS
TEMPERATURE: 98.2 F | SYSTOLIC BLOOD PRESSURE: 113 MMHG | HEIGHT: 67 IN | BODY MASS INDEX: 27.15 KG/M2 | WEIGHT: 173 LBS | HEART RATE: 83 BPM | OXYGEN SATURATION: 99 % | DIASTOLIC BLOOD PRESSURE: 74 MMHG | RESPIRATION RATE: 18 BRPM

## 2022-01-15 PROCEDURE — 90715 TDAP VACCINE 7 YRS/> IM: CPT | Performed by: OBSTETRICS & GYNECOLOGY

## 2022-01-15 PROCEDURE — 90471 IMMUNIZATION ADMIN: CPT | Performed by: OBSTETRICS & GYNECOLOGY

## 2022-01-15 PROCEDURE — 6370000000 HC RX 637 (ALT 250 FOR IP): Performed by: OBSTETRICS & GYNECOLOGY

## 2022-01-15 PROCEDURE — 6360000002 HC RX W HCPCS: Performed by: OBSTETRICS & GYNECOLOGY

## 2022-01-15 RX ADMIN — TETANUS TOXOID, REDUCED DIPHTHERIA TOXOID AND ACELLULAR PERTUSSIS VACCINE, ADSORBED 0.5 ML: 5; 2.5; 8; 8; 2.5 SUSPENSION INTRAMUSCULAR at 08:42

## 2022-01-15 RX ADMIN — BENZOCAINE AND LEVOMENTHOL: 200; 5 SPRAY TOPICAL at 07:30

## 2022-01-15 RX ADMIN — Medication: at 07:29

## 2022-01-15 RX ADMIN — DOCUSATE SODIUM 100 MG: 100 CAPSULE ORAL at 07:24

## 2022-01-15 RX ADMIN — WITCH HAZEL 40 EACH: 500 SOLUTION RECTAL; TOPICAL at 07:30

## 2022-01-15 RX ADMIN — IBUPROFEN 800 MG: 800 TABLET, FILM COATED ORAL at 07:23

## 2022-01-15 ASSESSMENT — PAIN DESCRIPTION - DESCRIPTORS: DESCRIPTORS: CRAMPING

## 2022-01-15 ASSESSMENT — PAIN SCALES - GENERAL: PAINLEVEL_OUTOF10: 4

## 2022-01-15 NOTE — DISCHARGE SUMMARY
Obstetrical Discharge Form         Patients Name  Marcelina Levy    Gestational Age:  41w3d    Antepartum complications: post-term    Date of Delivery:   2022       7:50 PM      Type of Delivery:   Vaginal, Spontaneous [250]   Rupture Date/time:    No data found      No data found     Presentation:    Vertex [1]   Position:                      Anesthesia:    Epidural [254]     Feeding method:         Delivered By:   Bettina MACIEL     Baby:       Information for the patient's :  May, [facility] [place] [[id number]]          Intrapartum complications: None  Postpartum complications: none  Discharge Date:   1/15/2022  Discharge Condition: Stable  Disposition:   Home    Plan:   Follow up    in 6 weeks

## 2022-11-19 ENCOUNTER — HOSPITAL ENCOUNTER (EMERGENCY)
Age: 27
Discharge: HOME | End: 2022-11-19
Attending: EMERGENCY MEDICINE
Payer: MEDICARE

## 2022-11-19 ENCOUNTER — APPOINTMENT (OUTPATIENT)
Dept: GENERAL RADIOLOGY | Age: 27
End: 2022-11-19
Payer: MEDICARE

## 2022-11-19 VITALS
DIASTOLIC BLOOD PRESSURE: 83 MMHG | TEMPERATURE: 97.5 F | HEART RATE: 75 BPM | SYSTOLIC BLOOD PRESSURE: 125 MMHG | RESPIRATION RATE: 16 BRPM | OXYGEN SATURATION: 97 %

## 2022-11-19 DIAGNOSIS — R07.89 ATYPICAL CHEST PAIN: Primary | ICD-10-CM

## 2022-11-19 LAB
ANION GAP SERPL CALCULATED.3IONS-SCNC: 11 MMOL/L (ref 7–16)
BASOPHILS ABSOLUTE: 0.06 E9/L (ref 0–0.2)
BASOPHILS RELATIVE PERCENT: 0.8 % (ref 0–2)
BUN BLDV-MCNC: 14 MG/DL (ref 6–20)
CALCIUM SERPL-MCNC: 9.5 MG/DL (ref 8.6–10.2)
CHLORIDE BLD-SCNC: 103 MMOL/L (ref 98–107)
CO2: 25 MMOL/L (ref 22–29)
CREAT SERPL-MCNC: 0.6 MG/DL (ref 0.5–1)
EOSINOPHILS ABSOLUTE: 0.13 E9/L (ref 0.05–0.5)
EOSINOPHILS RELATIVE PERCENT: 1.8 % (ref 0–6)
GFR SERPL CREATININE-BSD FRML MDRD: >60 ML/MIN/1.73
GLUCOSE BLD-MCNC: 126 MG/DL (ref 74–99)
HCT VFR BLD CALC: 41.9 % (ref 34–48)
HEMOGLOBIN: 13.9 G/DL (ref 11.5–15.5)
IMMATURE GRANULOCYTES #: 0.01 E9/L
IMMATURE GRANULOCYTES %: 0.1 % (ref 0–5)
LYMPHOCYTES ABSOLUTE: 1.81 E9/L (ref 1.5–4)
LYMPHOCYTES RELATIVE PERCENT: 25.2 % (ref 20–42)
MCH RBC QN AUTO: 28.8 PG (ref 26–35)
MCHC RBC AUTO-ENTMCNC: 33.2 % (ref 32–34.5)
MCV RBC AUTO: 86.9 FL (ref 80–99.9)
MONOCYTES ABSOLUTE: 0.33 E9/L (ref 0.1–0.95)
MONOCYTES RELATIVE PERCENT: 4.6 % (ref 2–12)
NEUTROPHILS ABSOLUTE: 4.85 E9/L (ref 1.8–7.3)
NEUTROPHILS RELATIVE PERCENT: 67.5 % (ref 43–80)
PDW BLD-RTO: 13.5 FL (ref 11.5–15)
PLATELET # BLD: 293 E9/L (ref 130–450)
PMV BLD AUTO: 10.9 FL (ref 7–12)
POTASSIUM SERPL-SCNC: 3.7 MMOL/L (ref 3.5–5)
RBC # BLD: 4.82 E12/L (ref 3.5–5.5)
SODIUM BLD-SCNC: 139 MMOL/L (ref 132–146)
TROPONIN, HIGH SENSITIVITY: <6 NG/L (ref 0–9)
WBC # BLD: 7.2 E9/L (ref 4.5–11.5)

## 2022-11-19 PROCEDURE — 80048 BASIC METABOLIC PNL TOTAL CA: CPT

## 2022-11-19 PROCEDURE — 84484 ASSAY OF TROPONIN QUANT: CPT

## 2022-11-19 PROCEDURE — 6370000000 HC RX 637 (ALT 250 FOR IP)

## 2022-11-19 PROCEDURE — 93005 ELECTROCARDIOGRAM TRACING: CPT

## 2022-11-19 PROCEDURE — 71045 X-RAY EXAM CHEST 1 VIEW: CPT

## 2022-11-19 PROCEDURE — 85025 COMPLETE CBC W/AUTO DIFF WBC: CPT

## 2022-11-19 PROCEDURE — 99285 EMERGENCY DEPT VISIT HI MDM: CPT

## 2022-11-19 RX ORDER — ONDANSETRON 4 MG/1
4 TABLET, ORALLY DISINTEGRATING ORAL ONCE
Status: COMPLETED | OUTPATIENT
Start: 2022-11-19 | End: 2022-11-19

## 2022-11-19 RX ADMIN — ONDANSETRON 4 MG: 4 TABLET, ORALLY DISINTEGRATING ORAL at 16:54

## 2022-11-19 ASSESSMENT — ENCOUNTER SYMPTOMS
VOMITING: 0
CHEST TIGHTNESS: 0
CONSTIPATION: 0
COUGH: 0
BLOOD IN STOOL: 0
NAUSEA: 0
SHORTNESS OF BREATH: 1
ABDOMINAL PAIN: 0
DIARRHEA: 0
SINUS PRESSURE: 0

## 2022-11-19 NOTE — ED PROVIDER NOTES
HPI   68-year-old female with past medical history for panic attacks presenting to the emergency department today with chief complaint of chest pain. She states that the symptoms started last night initially with mid back discomfort and that upon awakening this morning noticed that she had a little bit of central chest pain that was described as a pressure-like sensation without radiation. She has never had the symptoms before in the past and describes them as constant since onset and moderate in severity. Patient states that laying flat seems to make the symptoms better and that she has not taken any medications for them. She does state that since onset she has become increasingly anxious about this and that she feels that she is becoming a little bit short of breath due to it. She has no other cough or upper respiratory symptoms otherwise. Patient denies anything else make them better or worse. She otherwise denies any fever, chills, vomiting, abdominal pain, hematuria or dysuria, constipation or diarrhea. Review of Systems   Constitutional:  Negative for chills, fatigue and fever. HENT:  Negative for congestion, sinus pressure and tinnitus. Respiratory:  Positive for shortness of breath. Negative for cough and chest tightness. Cardiovascular:  Positive for chest pain. Negative for leg swelling. Gastrointestinal:  Negative for abdominal pain, blood in stool, constipation, diarrhea, nausea and vomiting. Endocrine: Negative for polydipsia and polyphagia. Genitourinary:  Negative for difficulty urinating and frequency. Skin:  Negative for rash and wound. Neurological:  Negative for weakness, light-headedness, numbness and headaches. Psychiatric/Behavioral:  Negative for agitation and confusion. The patient is nervous/anxious. Physical Exam  Vitals and nursing note reviewed. Constitutional:       General: She is not in acute distress. Appearance: Normal appearance.  She is not ill-appearing, toxic-appearing or diaphoretic. HENT:      Head: Normocephalic and atraumatic. Eyes:      Pupils: Pupils are equal, round, and reactive to light. Cardiovascular:      Rate and Rhythm: Normal rate and regular rhythm. Pulses: Normal pulses. Heart sounds: Normal heart sounds. No murmur heard. No friction rub. No gallop. Pulmonary:      Effort: Pulmonary effort is normal. No respiratory distress. Breath sounds: Normal breath sounds. No stridor. No wheezing, rhonchi or rales. Abdominal:      General: Bowel sounds are normal. There is no distension. Palpations: Abdomen is soft. There is no mass. Tenderness: There is no abdominal tenderness. There is no guarding or rebound. Musculoskeletal:         General: No swelling, tenderness, deformity or signs of injury. Normal range of motion. Cervical back: No rigidity or tenderness. Right lower leg: No edema. Left lower leg: No edema. Skin:     General: Skin is warm and dry. Capillary Refill: Capillary refill takes less than 2 seconds. Coloration: Skin is not jaundiced or pale. Findings: No bruising, erythema, lesion or rash. Neurological:      General: No focal deficit present. Mental Status: She is alert and oriented to person, place, and time. Psychiatric:         Mood and Affect: Mood normal.         Behavior: Behavior normal.        Procedures     MDM  69-year-old female presenting to the emergency department with chief complaint of chest pain. Lab studies for CBC, BMP, EKG and troponin as well as 1 view chest x-ray. Patient given 4 mg of Zofran ODT for nausea and is given some water per her request.  CBC, BMP, troponin returned within normal limits. Chest x-ray has no acute processes. Patient feels well after Zofran and tolerated p.o. challenge with water. She is informed of all laboratory and imaging findings and is very relieved to hear these results.   All of her 11.5 - 15.0 fL    Platelets 222 501 - 238 E9/L    MPV 10.9 7.0 - 12.0 fL    Neutrophils % 67.5 43.0 - 80.0 %    Immature Granulocytes % 0.1 0.0 - 5.0 %    Lymphocytes % 25.2 20.0 - 42.0 %    Monocytes % 4.6 2.0 - 12.0 %    Eosinophils % 1.8 0.0 - 6.0 %    Basophils % 0.8 0.0 - 2.0 %    Neutrophils Absolute 4.85 1.80 - 7.30 E9/L    Immature Granulocytes # 0.01 E9/L    Lymphocytes Absolute 1.81 1.50 - 4.00 E9/L    Monocytes Absolute 0.33 0.10 - 0.95 E9/L    Eosinophils Absolute 0.13 0.05 - 0.50 E9/L    Basophils Absolute 0.06 0.00 - 0.20 E9/L   Troponin   Result Value Ref Range    Troponin, High Sensitivity <6 0 - 9 ng/L   EKG 12 Lead   Result Value Ref Range    Ventricular Rate 94 BPM    Atrial Rate 94 BPM    P-R Interval 140 ms    QRS Duration 80 ms    Q-T Interval 342 ms    QTc Calculation (Bazett) 427 ms    P Axis 79 degrees    R Axis 52 degrees    T Axis 35 degrees       Radiology:  XR CHEST 1 VIEW   Final Result   No acute process. ------------------------- NURSING NOTES AND VITALS REVIEWED ---------------------------  Date / Time Roomed:  11/19/2022  4:37 PM  ED Bed Assignment:  25/25    The nursing notes within the ED encounter and vital signs as below have been reviewed. /83   Pulse 75   Temp 97.5 °F (36.4 °C)   Resp 16   LMP 11/13/2022   SpO2 97%   Oxygen Saturation Interpretation: Normal      ------------------------------------------ PROGRESS NOTES ------------------------------------------  4:59 PM EST  I have spoken with the patient and discussed todays results, in addition to providing specific details for the plan of care and counseling regarding the diagnosis and prognosis. Their questions are answered at this time and they are agreeable with the plan. I discussed at length with them reasons for immediate return here for re evaluation.  They will followup with their primary care physician by calling their office on Monday.      --------------------------------- ADDITIONAL PROVIDER NOTES ---------------------------------  At this time the patient is without objective evidence of an acute process requiring hospitalization or inpatient management. They have remained hemodynamically stable throughout their entire ED visit and are stable for discharge with outpatient follow-up. The plan has been discussed in detail and they are aware of the specific conditions for emergent return, as well as the importance of follow-up. Discharge Medication List as of 11/19/2022  6:06 PM          Diagnosis:  1. Atypical chest pain        Disposition:  Patient's disposition: Discharge to home  Patient's condition is stable.          Brent Mancia DO  Resident  11/20/22 [id number]

## 2022-11-19 NOTE — DISCHARGE INSTRUCTIONS
Please follow-up with your family physician over the next several days and return to this emergency department if symptoms persist or worsen. Take Tylenol or ibuprofen as needed for muscular pain.

## 2022-11-20 LAB
EKG ATRIAL RATE: 94 BPM
EKG P AXIS: 79 DEGREES
EKG P-R INTERVAL: 140 MS
EKG Q-T INTERVAL: 342 MS
EKG QRS DURATION: 80 MS
EKG QTC CALCULATION (BAZETT): 427 MS
EKG R AXIS: 52 DEGREES
EKG T AXIS: 35 DEGREES
EKG VENTRICULAR RATE: 94 BPM

## 2022-11-20 PROCEDURE — 93010 ELECTROCARDIOGRAM REPORT: CPT | Performed by: INTERNAL MEDICINE

## 2023-11-30 ENCOUNTER — TELEPHONE (OUTPATIENT)
Dept: ENT CLINIC | Age: 28
End: 2023-11-30

## 2023-11-30 NOTE — TELEPHONE ENCOUNTER
Patient called back in regards to current symptoms patient states saliva has some blood,drainage and nasal congestion intermittently patient would like appointment for allergy/sinus.

## 2023-11-30 NOTE — TELEPHONE ENCOUNTER
Pt called to see if she can get seen with Bobby Gilman as she has been having some blood in her saliva, possible drainage from her nose for appx 10 days. Seen her PCP Maria Leigh and she stated she was not too concerned as she has npt been throwing it up or excessive bleeding. She would like to speak to the office as her son is a pt there. Please contact.

## 2023-12-12 ENCOUNTER — HOSPITAL ENCOUNTER (EMERGENCY)
Age: 28
Discharge: HOME | End: 2023-12-12
Attending: EMERGENCY MEDICINE
Payer: MEDICAID

## 2023-12-12 VITALS
DIASTOLIC BLOOD PRESSURE: 95 MMHG | BODY MASS INDEX: 21.14 KG/M2 | WEIGHT: 135 LBS | HEART RATE: 99 BPM | TEMPERATURE: 98.2 F | SYSTOLIC BLOOD PRESSURE: 146 MMHG | OXYGEN SATURATION: 98 % | RESPIRATION RATE: 16 BRPM

## 2023-12-12 DIAGNOSIS — K21.9 GASTROESOPHAGEAL REFLUX DISEASE WITHOUT ESOPHAGITIS: ICD-10-CM

## 2023-12-12 DIAGNOSIS — K05.10 GINGIVITIS: Primary | ICD-10-CM

## 2023-12-12 PROCEDURE — 99283 EMERGENCY DEPT VISIT LOW MDM: CPT

## 2023-12-12 RX ORDER — CHLORHEXIDINE GLUCONATE ORAL RINSE 1.2 MG/ML
15 SOLUTION DENTAL 2 TIMES DAILY
Qty: 420 ML | Refills: 0 | Status: SHIPPED | OUTPATIENT
Start: 2023-12-12 | End: 2023-12-26

## 2023-12-12 ASSESSMENT — ENCOUNTER SYMPTOMS
COUGH: 0
RHINORRHEA: 0
CHEST TIGHTNESS: 0
ABDOMINAL PAIN: 0
BACK PAIN: 0
SHORTNESS OF BREATH: 0
DIARRHEA: 0
NAUSEA: 0
SINUS PRESSURE: 0
VOICE CHANGE: 0
TROUBLE SWALLOWING: 0
VOMITING: 0
WHEEZING: 0
SORE THROAT: 0

## 2023-12-12 ASSESSMENT — PAIN - FUNCTIONAL ASSESSMENT: PAIN_FUNCTIONAL_ASSESSMENT: NONE - DENIES PAIN

## 2023-12-12 ASSESSMENT — LIFESTYLE VARIABLES
HOW MANY STANDARD DRINKS CONTAINING ALCOHOL DO YOU HAVE ON A TYPICAL DAY: PATIENT DOES NOT DRINK
HOW OFTEN DO YOU HAVE A DRINK CONTAINING ALCOHOL: MONTHLY OR LESS

## 2023-12-12 NOTE — ED PROVIDER NOTES
sounds are normal. There is no distension. Palpations: Abdomen is soft. Tenderness: There is no abdominal tenderness. There is no right CVA tenderness, left CVA tenderness, guarding or rebound. Musculoskeletal:         General: No swelling, tenderness, deformity or signs of injury. Cervical back: Full passive range of motion without pain, normal range of motion and neck supple. No signs of trauma or rigidity. No pain with movement, spinous process tenderness or muscular tenderness. Normal range of motion. Right lower leg: No edema. Left lower leg: No edema. Comments: Arms and legs are neurovascular intact and well-perfused   Lymphadenopathy:      Cervical: No cervical adenopathy. Right cervical: No superficial or posterior cervical adenopathy. Left cervical: No superficial or posterior cervical adenopathy. Skin:     General: Skin is warm and dry. Coloration: Skin is not cyanotic, jaundiced, mottled or pale. Findings: No bruising, erythema, petechiae or rash. Rash is not purpuric. Neurological:      General: No focal deficit present. Mental Status: She is alert and oriented to person, place, and time. GCS: GCS eye subscore is 4. GCS verbal subscore is 5. GCS motor subscore is 6. Cranial Nerves: Cranial nerves 2-12 are intact. No cranial nerve deficit. Sensory: Sensation is intact. Motor: Motor function is intact. Coordination: Coordination is intact. Coordination normal.      Gait: Gait is intact. Psychiatric:         Behavior: Behavior is cooperative. Procedures     MDM            History provided by: The patient      Patient here complaining of sensation of blood in her mouth on and off for the last month or so. Physical exam is unremarkable. She does relate history of GERD as well and is on famotidine. Abdomen soft and nontender. HEENT exam unremarkable, no intraoral lesions.   See above for full exam.      Social

## 2024-01-24 ENCOUNTER — HOSPITAL ENCOUNTER (EMERGENCY)
Age: 29
Discharge: LEFT BEFORE DISCHARGE | End: 2024-01-24

## 2024-01-24 VITALS — OXYGEN SATURATION: 100 % | TEMPERATURE: 98 F | HEART RATE: 89 BPM

## 2024-05-03 ENCOUNTER — TRANSCRIBE ORDERS (OUTPATIENT)
Dept: ADMINISTRATIVE | Age: 29
End: 2024-05-03

## 2024-05-03 DIAGNOSIS — N64.9 DISORDER OF BREAST: Primary | ICD-10-CM

## 2024-05-21 ENCOUNTER — HOSPITAL ENCOUNTER (OUTPATIENT)
Dept: ULTRASOUND IMAGING | Age: 29
Discharge: HOME | End: 2024-05-21
Attending: OBSTETRICS & GYNECOLOGY

## 2024-05-21 DIAGNOSIS — N64.9 DISORDER OF BREAST: ICD-10-CM

## 2024-05-21 PROCEDURE — 76641 ULTRASOUND BREAST COMPLETE: CPT

## 2024-09-06 RX ORDER — LEVONORGESTREL AND ETHINYL ESTRADIOL 0.15-0.03
KIT ORAL
Qty: 28 TABLET | Refills: 3 | OUTPATIENT
Start: 2024-09-06

## 2024-10-07 RX ORDER — LEVONORGESTREL AND ETHINYL ESTRADIOL 0.15-0.03
KIT ORAL
Qty: 28 TABLET | OUTPATIENT
Start: 2024-10-07

## 2025-01-30 PROCEDURE — 85025 COMPLETE CBC W/AUTO DIFF WBC: CPT

## 2025-01-30 PROCEDURE — 99284 EMERGENCY DEPT VISIT MOD MDM: CPT

## 2025-01-30 PROCEDURE — 83690 ASSAY OF LIPASE: CPT

## 2025-01-30 PROCEDURE — 80053 COMPREHEN METABOLIC PANEL: CPT

## 2025-01-30 PROCEDURE — 96374 THER/PROPH/DIAG INJ IV PUSH: CPT

## 2025-01-30 RX ORDER — 0.9 % SODIUM CHLORIDE 0.9 %
2000 INTRAVENOUS SOLUTION INTRAVENOUS ONCE
Status: COMPLETED | OUTPATIENT
Start: 2025-01-30 | End: 2025-01-31

## 2025-01-30 RX ORDER — METOCLOPRAMIDE HYDROCHLORIDE 5 MG/ML
10 INJECTION INTRAMUSCULAR; INTRAVENOUS ONCE
Status: COMPLETED | OUTPATIENT
Start: 2025-01-30 | End: 2025-01-31

## 2025-01-30 RX ORDER — DIPHENHYDRAMINE HYDROCHLORIDE 50 MG/ML
25 INJECTION INTRAMUSCULAR; INTRAVENOUS ONCE
Status: COMPLETED | OUTPATIENT
Start: 2025-01-30 | End: 2025-01-31

## 2025-01-30 ASSESSMENT — LIFESTYLE VARIABLES
HOW MANY STANDARD DRINKS CONTAINING ALCOHOL DO YOU HAVE ON A TYPICAL DAY: PATIENT DOES NOT DRINK
HOW OFTEN DO YOU HAVE A DRINK CONTAINING ALCOHOL: NEVER

## 2025-01-31 ENCOUNTER — HOSPITAL ENCOUNTER (EMERGENCY)
Age: 30
Discharge: HOME | End: 2025-01-31

## 2025-01-31 VITALS
DIASTOLIC BLOOD PRESSURE: 79 MMHG | OXYGEN SATURATION: 100 % | RESPIRATION RATE: 18 BRPM | HEART RATE: 98 BPM | SYSTOLIC BLOOD PRESSURE: 110 MMHG | TEMPERATURE: 97.3 F

## 2025-01-31 DIAGNOSIS — R82.71 ASYMPTOMATIC BACTERIURIA IN PREGNANCY IN FIRST TRIMESTER: ICD-10-CM

## 2025-01-31 DIAGNOSIS — O99.891 ASYMPTOMATIC BACTERIURIA IN PREGNANCY IN FIRST TRIMESTER: ICD-10-CM

## 2025-01-31 DIAGNOSIS — R11.2 NAUSEA VOMITING AND DIARRHEA: Primary | ICD-10-CM

## 2025-01-31 DIAGNOSIS — R19.7 NAUSEA VOMITING AND DIARRHEA: Primary | ICD-10-CM

## 2025-01-31 LAB
ALBUMIN SERPL-MCNC: 5 G/DL (ref 3.5–5.2)
ALP SERPL-CCNC: 50 U/L (ref 35–104)
ALT SERPL-CCNC: 11 U/L (ref 0–32)
ANION GAP SERPL CALCULATED.3IONS-SCNC: 17 MMOL/L (ref 7–16)
AST SERPL-CCNC: 14 U/L (ref 0–31)
BACTERIA URNS QL MICRO: ABNORMAL
BASOPHILS # BLD: 0.06 K/UL (ref 0–0.2)
BASOPHILS NFR BLD: 0 % (ref 0–2)
BILIRUB SERPL-MCNC: 1.1 MG/DL (ref 0–1.2)
BILIRUB UR QL STRIP: NEGATIVE
BUN SERPL-MCNC: 11 MG/DL (ref 6–20)
CALCIUM SERPL-MCNC: 9.7 MG/DL (ref 8.6–10.2)
CHLORIDE SERPL-SCNC: 105 MMOL/L (ref 98–107)
CLARITY UR: CLEAR
CO2 SERPL-SCNC: 19 MMOL/L (ref 22–29)
COLOR UR: YELLOW
CREAT SERPL-MCNC: 0.5 MG/DL (ref 0.5–1)
EOSINOPHIL # BLD: 0.01 K/UL (ref 0.05–0.5)
EOSINOPHILS RELATIVE PERCENT: 0 % (ref 0–6)
EPI CELLS #/AREA URNS HPF: ABNORMAL /HPF
ERYTHROCYTE [DISTWIDTH] IN BLOOD BY AUTOMATED COUNT: 13 % (ref 11.5–15)
FLUAV RNA RESP QL NAA+PROBE: NOT DETECTED
FLUBV RNA RESP QL NAA+PROBE: NOT DETECTED
GFR, ESTIMATED: >90 ML/MIN/1.73M2
GLUCOSE SERPL-MCNC: 125 MG/DL (ref 74–99)
GLUCOSE UR STRIP-MCNC: NEGATIVE MG/DL
HCT VFR BLD AUTO: 38.6 % (ref 34–48)
HGB BLD-MCNC: 13.5 G/DL (ref 11.5–15.5)
HGB UR QL STRIP.AUTO: NEGATIVE
IMM GRANULOCYTES # BLD AUTO: 0.08 K/UL (ref 0–0.58)
IMM GRANULOCYTES NFR BLD: 1 % (ref 0–5)
KETONES UR STRIP-MCNC: >80 MG/DL
LEUKOCYTE ESTERASE UR QL STRIP: NEGATIVE
LIPASE SERPL-CCNC: 21 U/L (ref 13–60)
LYMPHOCYTES NFR BLD: 0.61 K/UL (ref 1.5–4)
LYMPHOCYTES RELATIVE PERCENT: 5 % (ref 20–42)
MCH RBC QN AUTO: 29.5 PG (ref 26–35)
MCHC RBC AUTO-ENTMCNC: 35 G/DL (ref 32–34.5)
MCV RBC AUTO: 84.5 FL (ref 80–99.9)
MONOCYTES NFR BLD: 0.22 K/UL (ref 0.1–0.95)
MONOCYTES NFR BLD: 2 % (ref 2–12)
MUCOUS THREADS URNS QL MICRO: PRESENT
NEUTROPHILS NFR BLD: 93 % (ref 43–80)
NEUTS SEG NFR BLD: 12.49 K/UL (ref 1.8–7.3)
NITRITE UR QL STRIP: NEGATIVE
PH UR STRIP: 6 [PH] (ref 5–8)
PLATELET # BLD AUTO: 248 K/UL (ref 130–450)
PMV BLD AUTO: 10.8 FL (ref 7–12)
POTASSIUM SERPL-SCNC: 3.7 MMOL/L (ref 3.5–5)
PROT SERPL-MCNC: 7.7 G/DL (ref 6.4–8.3)
PROT UR STRIP-MCNC: 30 MG/DL
RBC # BLD AUTO: 4.57 M/UL (ref 3.5–5.5)
RBC #/AREA URNS HPF: ABNORMAL /HPF
SARS-COV-2 RNA RESP QL NAA+PROBE: NOT DETECTED
SODIUM SERPL-SCNC: 141 MMOL/L (ref 132–146)
SOURCE: NORMAL
SP GR UR STRIP: 1.02 (ref 1–1.03)
SPECIMEN DESCRIPTION: NORMAL
UROBILINOGEN UR STRIP-ACNC: 0.2 EU/DL (ref 0–1)
WBC #/AREA URNS HPF: ABNORMAL /HPF
WBC OTHER # BLD: 13.5 K/UL (ref 4.5–11.5)

## 2025-01-31 PROCEDURE — 96375 TX/PRO/DX INJ NEW DRUG ADDON: CPT

## 2025-01-31 PROCEDURE — 81001 URINALYSIS AUTO W/SCOPE: CPT

## 2025-01-31 PROCEDURE — 6370000000 HC RX 637 (ALT 250 FOR IP)

## 2025-01-31 PROCEDURE — 87086 URINE CULTURE/COLONY COUNT: CPT

## 2025-01-31 PROCEDURE — 87636 SARSCOV2 & INF A&B AMP PRB: CPT

## 2025-01-31 PROCEDURE — 2580000003 HC RX 258

## 2025-01-31 PROCEDURE — 87798 DETECT AGENT NOS DNA AMP: CPT

## 2025-01-31 PROCEDURE — 6360000002 HC RX W HCPCS

## 2025-01-31 RX ORDER — ONDANSETRON 4 MG/1
4 TABLET, ORALLY DISINTEGRATING ORAL 3 TIMES DAILY PRN
Qty: 21 TABLET | Refills: 0 | Status: SHIPPED | OUTPATIENT
Start: 2025-01-31

## 2025-01-31 RX ORDER — CEPHALEXIN 500 MG/1
500 CAPSULE ORAL 4 TIMES DAILY
Qty: 28 CAPSULE | Refills: 0 | Status: SHIPPED | OUTPATIENT
Start: 2025-01-31 | End: 2025-02-07

## 2025-01-31 RX ADMIN — SODIUM CHLORIDE 2000 ML: 9 INJECTION, SOLUTION INTRAVENOUS at 02:35

## 2025-01-31 RX ADMIN — METOCLOPRAMIDE HYDROCHLORIDE 10 MG: 5 INJECTION, SOLUTION INTRAMUSCULAR; INTRAVENOUS at 01:58

## 2025-01-31 RX ADMIN — CEPHALEXIN 500 MG: 250 CAPSULE ORAL at 03:26

## 2025-01-31 RX ADMIN — DIPHENHYDRAMINE HYDROCHLORIDE 25 MG: 50 INJECTION INTRAMUSCULAR; INTRAVENOUS at 02:03

## 2025-01-31 NOTE — ED PROVIDER NOTES
Independent [facility] Visit.          [facility] EMERGENCY DEPARTMENT  EMERGENCY DEPARTMENT ENCOUNTER        Pt Name: Sandra Levy  MRN: [id number]  Birthdate 1995  Date of evaluation: 2025  Provider: TERESITA Zhu CNP  PCP: Thierno Yeager DO  Note Started: 10:10 PM EST 25    CHIEF COMPLAINT       Chief Complaint   Patient presents with    Vomiting     Vomiting and diarrhea starting a few hours ago. No other complaints at this time. Pt is 12 weeks pregnant.        HISTORY OF PRESENT ILLNESS: 1 or more Elements   History from : Patient and chart review  Limitations to history : None    Sandra Levy is a 29 y.o.  female  who is currently 11 weekspresents to the emergency department by private vehicle, for evaluation of sudden onset of nausea, vomiting, and diarrhea that started around 8 PM, 2 hours prior to arrival.  Since onset the symptoms have been remaining constant and moderate in severity.  Symptoms are associated with no additional symptoms. She denies any abdominal pain, back pain, fever, chills, sweating, anorexia, constipation, dark/black stools, blood in stool, blood in emesis, urinary frequency, dysuria, hematuria, urinary urgency, vaginal discharge, or passage of tissue.  She has had 5 episodes of vomiting and 4 episodes of diarrhea.  No recent travel.  No camping or contaminated water concerns.  She did eat leftover steak from [employer], but it was well cooked and stored properly.  She did reach out to Dr. Villalobos's office to inquire whether or not she could take a Zofran that she had at home and was advised it was okay.  She did take Zofran, but vomited shortly after.      Nursing Notes were all reviewed and agreed with or any disagreements were addressed in the HPI.    REVIEW OF SYSTEMS :      Review of Systems    Positives and Pertinent negatives as per HPI.     PAST MEDICAL HISTORY    has a past medical history of  (spontaneous vaginal delivery) (2022).

## 2025-02-01 LAB
MICROORGANISM SPEC CULT: ABNORMAL
MICROORGANISM SPEC CULT: ABNORMAL
SERVICE CMNT-IMP: ABNORMAL
SPECIMEN DESCRIPTION: ABNORMAL

## 2025-02-03 LAB
NOROVIRUS GI RNA STL QL NAA+PROBE: NOT DETECTED
NOROVIRUS GII RNA STL QL NAA+PROBE: DETECTED

## 2025-05-17 ENCOUNTER — HOSPITAL ENCOUNTER (OUTPATIENT)
Age: 30
Discharge: HOME | End: 2025-05-17
Attending: OBSTETRICS & GYNECOLOGY | Admitting: OBSTETRICS & GYNECOLOGY
Payer: MEDICAID

## 2025-05-17 VITALS
RESPIRATION RATE: 18 BRPM | DIASTOLIC BLOOD PRESSURE: 69 MMHG | OXYGEN SATURATION: 99 % | TEMPERATURE: 98.4 F | SYSTOLIC BLOOD PRESSURE: 110 MMHG | HEART RATE: 84 BPM

## 2025-05-17 PROBLEM — Z34.93 NORMAL PREGNANCY IN THIRD TRIMESTER: Status: ACTIVE | Noted: 2025-05-17

## 2025-05-17 LAB
BILIRUB UR QL STRIP: NEGATIVE
CLARITY UR: CLEAR
COLOR UR: YELLOW
EPI CELLS #/AREA URNS HPF: ABNORMAL /HPF
GLUCOSE UR STRIP-MCNC: NEGATIVE MG/DL
HGB UR QL STRIP.AUTO: NEGATIVE
KETONES UR STRIP-MCNC: NEGATIVE MG/DL
LEUKOCYTE ESTERASE UR QL STRIP: ABNORMAL
NITRITE UR QL STRIP: NEGATIVE
PH UR STRIP: 7 [PH] (ref 5–8)
PROT UR STRIP-MCNC: NEGATIVE MG/DL
RBC #/AREA URNS HPF: ABNORMAL /HPF
SP GR UR STRIP: 1.01 (ref 1–1.03)
UROBILINOGEN UR STRIP-ACNC: 0.2 EU/DL (ref 0–1)
WBC #/AREA URNS HPF: ABNORMAL /HPF

## 2025-05-17 PROCEDURE — 81001 URINALYSIS AUTO W/SCOPE: CPT

## 2025-05-17 PROCEDURE — 99202 OFFICE O/P NEW SF 15 MIN: CPT

## 2025-05-17 NOTE — PROGRESS NOTES
Dr. Villalobos updated on patient arrival, complaints and EFM. Orders to continue monitoring fetal heart tones and send a UA.

## 2025-05-17 NOTE — PROGRESS NOTES
25w6d    Patient arrived to labor and delivery with complaints of decreased fetal movement and the fetal movement is at a different spot than what she normally feels. Patient denies vaginal bleeding, leaking of fluid or contractions. Patient oriented to triage 3, call light within reach.

## 2025-05-17 NOTE — PROGRESS NOTES
Dr. Villalobos updated on UA results, EFM and that patient is feeling fetal movement now. Orders to discharge patient.

## 2025-05-30 ENCOUNTER — HOSPITAL ENCOUNTER (OUTPATIENT)
Age: 30
Discharge: HOME | End: 2025-05-30
Payer: MEDICAID

## 2025-05-30 LAB
AMOUNT GLUCOSE GIVEN: 50 G
COLLECT TIME, 1HR GLUCOSE: 1447
ERYTHROCYTE [DISTWIDTH] IN BLOOD BY AUTOMATED COUNT: 12.8 % (ref 11.5–15)
GLUCOSE 3H P 100 G GLC PO SERPL-MCNC: 108 MG/DL
HCT VFR BLD AUTO: 30.1 % (ref 34–48)
HGB BLD-MCNC: 10.1 G/DL (ref 11.5–15.5)
MCH RBC QN AUTO: 29.8 PG (ref 26–35)
MCHC RBC AUTO-ENTMCNC: 33.6 G/DL (ref 32–34.5)
MCV RBC AUTO: 88.8 FL (ref 80–99.9)
PLATELET # BLD AUTO: 247 K/UL (ref 130–450)
PMV BLD AUTO: 11.2 FL (ref 7–12)
RBC # BLD AUTO: 3.39 M/UL (ref 3.5–5.5)
WBC OTHER # BLD: 7.8 K/UL (ref 4.5–11.5)

## 2025-05-30 PROCEDURE — 36415 COLL VENOUS BLD VENIPUNCTURE: CPT

## 2025-05-30 PROCEDURE — 85027 COMPLETE CBC AUTOMATED: CPT

## 2025-05-30 PROCEDURE — 82950 GLUCOSE TEST: CPT

## 2025-07-02 ENCOUNTER — HOSPITAL ENCOUNTER (OUTPATIENT)
Age: 30
Discharge: HOME | End: 2025-07-02
Attending: OBSTETRICS & GYNECOLOGY | Admitting: OBSTETRICS & GYNECOLOGY
Payer: MEDICAID

## 2025-07-02 VITALS
DIASTOLIC BLOOD PRESSURE: 64 MMHG | RESPIRATION RATE: 16 BRPM | SYSTOLIC BLOOD PRESSURE: 118 MMHG | TEMPERATURE: 98.4 F | HEART RATE: 95 BPM

## 2025-07-02 PROBLEM — R51.9 PREGNANCY HEADACHE IN THIRD TRIMESTER: Status: ACTIVE | Noted: 2025-07-02

## 2025-07-02 PROBLEM — O26.893 PREGNANCY HEADACHE IN THIRD TRIMESTER: Status: ACTIVE | Noted: 2025-07-02

## 2025-07-02 PROBLEM — Z3A.32 32 WEEKS GESTATION OF PREGNANCY: Status: ACTIVE | Noted: 2025-07-02

## 2025-07-02 LAB
ALBUMIN SERPL-MCNC: 3.6 G/DL (ref 3.5–5.2)
ALP SERPL-CCNC: 66 U/L (ref 35–104)
ALT SERPL-CCNC: 9 U/L (ref 0–35)
ANION GAP SERPL CALCULATED.3IONS-SCNC: 11 MMOL/L (ref 7–16)
AST SERPL-CCNC: 17 U/L (ref 0–35)
BASOPHILS # BLD: 0.05 K/UL (ref 0–0.2)
BASOPHILS NFR BLD: 1 % (ref 0–2)
BILIRUB SERPL-MCNC: 0.5 MG/DL (ref 0–1.2)
BILIRUB UR QL STRIP: NEGATIVE
BUN SERPL-MCNC: 4 MG/DL (ref 6–20)
CALCIUM SERPL-MCNC: 8 MG/DL (ref 8.6–10)
CHLORIDE SERPL-SCNC: 107 MMOL/L (ref 98–107)
CLARITY UR: CLEAR
CO2 SERPL-SCNC: 20 MMOL/L (ref 22–29)
COLOR UR: YELLOW
CREAT SERPL-MCNC: 0.5 MG/DL (ref 0.5–1)
EOSINOPHIL # BLD: 0.08 K/UL (ref 0.05–0.5)
EOSINOPHILS RELATIVE PERCENT: 1 % (ref 0–6)
ERYTHROCYTE [DISTWIDTH] IN BLOOD BY AUTOMATED COUNT: 12.7 % (ref 11.5–15)
GFR, ESTIMATED: >90 ML/MIN/1.73M2
GLUCOSE SERPL-MCNC: 107 MG/DL (ref 74–99)
GLUCOSE UR STRIP-MCNC: NEGATIVE MG/DL
HCT VFR BLD AUTO: 27.5 % (ref 34–48)
HGB BLD-MCNC: 9.2 G/DL (ref 11.5–15.5)
HGB UR QL STRIP.AUTO: NEGATIVE
IMM GRANULOCYTES # BLD AUTO: 0.1 K/UL (ref 0–0.58)
IMM GRANULOCYTES NFR BLD: 1 % (ref 0–5)
KETONES UR STRIP-MCNC: NEGATIVE MG/DL
LEUKOCYTE ESTERASE UR QL STRIP: NEGATIVE
LYMPHOCYTES NFR BLD: 1.74 K/UL (ref 1.5–4)
LYMPHOCYTES RELATIVE PERCENT: 24 % (ref 20–42)
MCH RBC QN AUTO: 28.8 PG (ref 26–35)
MCHC RBC AUTO-ENTMCNC: 33.5 G/DL (ref 32–34.5)
MCV RBC AUTO: 85.9 FL (ref 80–99.9)
MONOCYTES NFR BLD: 0.44 K/UL (ref 0.1–0.95)
MONOCYTES NFR BLD: 6 % (ref 2–12)
NEUTROPHILS NFR BLD: 67 % (ref 43–80)
NEUTS SEG NFR BLD: 4.93 K/UL (ref 1.8–7.3)
NITRITE UR QL STRIP: NEGATIVE
PH UR STRIP: 7 [PH] (ref 5–8)
PLATELET, FLUORESCENCE: 214 K/UL (ref 130–450)
PMV BLD AUTO: 11.4 FL (ref 7–12)
POTASSIUM SERPL-SCNC: 3.5 MMOL/L (ref 3.5–5.1)
PROT SERPL-MCNC: 6.3 G/DL (ref 6.4–8.3)
PROT UR STRIP-MCNC: NEGATIVE MG/DL
RBC # BLD AUTO: 3.2 M/UL (ref 3.5–5.5)
RBC #/AREA URNS HPF: ABNORMAL /HPF
SODIUM SERPL-SCNC: 138 MMOL/L (ref 136–145)
SP GR UR STRIP: <1.005 (ref 1–1.03)
URATE SERPL-MCNC: 3.6 MG/DL (ref 2.4–5.7)
UROBILINOGEN UR STRIP-ACNC: 0.2 EU/DL (ref 0–1)
WBC #/AREA URNS HPF: ABNORMAL /HPF
WBC OTHER # BLD: 7.3 K/UL (ref 4.5–11.5)

## 2025-07-02 PROCEDURE — 85025 COMPLETE CBC W/AUTO DIFF WBC: CPT

## 2025-07-02 PROCEDURE — 81001 URINALYSIS AUTO W/SCOPE: CPT

## 2025-07-02 PROCEDURE — 99211 OFF/OP EST MAY X REQ PHY/QHP: CPT

## 2025-07-02 PROCEDURE — 80053 COMPREHEN METABOLIC PANEL: CPT

## 2025-07-02 PROCEDURE — 84550 ASSAY OF BLOOD/URIC ACID: CPT

## 2025-07-02 ASSESSMENT — PAIN SCALES - GENERAL: PAINLEVEL_OUTOF10: 0

## 2025-07-02 NOTE — H&P
Subjective:      Sandra Levy is an 30 y.o. female at 32 and 3/7 weeks gestation presenting with lightheadedness lower back pain and a blurry vision.  Denies bleeding or fluid leak.  Denies any contractions.  Other associated symptoms include low back pain. Fetal Movement: normal.  Patient's medications, allergies, past medical, surgical, social and family histories were reviewed and updated as appropriate.  Social History:  TOBACCO:   reports that she has quit smoking. She uses smokeless tobacco.  ETOH:   reports that she does not currently use alcohol.  DRUGS:   reports no history of drug use.    Past Medical History:  Past Medical History:   Diagnosis Date     (spontaneous vaginal delivery) 2022         Past Surgical History:      Procedure Laterality Date    APPENDECTOMY         Family History:   No family history on file.    meds:No current facility-administered medications for this encounter.       Allergies:  Flagyl [metronidazole]    Review of Systems  Review of Systems -  General ROS: negative for - chills, fatigue or malaise  ENT ROS: negative for - hearing change, nasal congestion or nasal discharge  Allergy and Immunology ROS: negative for - hives, itchy/watery eyes or nasal congestion  Hematological and Lymphatic ROS: negative for - blood clots, blood transfusions, bruising or fatigue  Endocrine ROS: negative for - malaise/lethargy, mood swings, palpitations or polydipsia/polyuria  Breast ROS: negative for - new or changing breast lumps or nipple changes  Respiratory ROS: negative for - sputum changes, stridor, tachypnea or wheezing  Cardiovascular ROS: negative for - irregular heartbeat, loss of consciousness, murmur or orthopnea  Gastrointestinal ROS: negative for - constipation, diarrhea, gas/bloating, heartburn or hematemesis  Genito-Urinary ROS: negative for -  genital discharge, genital ulcers or hematuria  Musculoskeletal ROS: negative for - gait disturbance, muscle pain or muscular

## 2025-07-02 NOTE — PROGRESS NOTES
patient 32.3 weeks of Dr. Workman presents today for complaints of floaters in her eyes, lower back pain, and light headed.  No complaints of leakaghe of lfuid, bleeding, or cramping.  Positive fetal movements.     Informed  That patient is here and her complaints.  Ordered for PIH workup: CBC, CMP, UA, Uric acid.

## 2025-07-02 NOTE — PROGRESS NOTES
Dr. Giorgio Villalba present in LND. Lab results reviewed. Orders received. EFM removed. Pt verbalizes understanding of discharge instructions. To keep next scheduled appointment.

## 2025-07-02 NOTE — DISCHARGE INSTRUCTIONS
Home Undelivered Discharge Instructions    After Discharge Orders  Keep next scheduled appointment                Diet:  normal diet as tolerated    Rest: normal activity as tolerated    Call physician or midwife, return to Labor and Delivery, call 911, or go to the nearest Emergency Room if: decreased fetal movement, persistent low back pain or cramping, persistent headache, or vision change

## 2025-07-02 NOTE — PROGRESS NOTES
Notified Dr. DELANEY of lab and urine results. Updated on patient feeling better. He states he will be in to evaluate.

## 2025-07-02 NOTE — PROGRESS NOTES
Patient denies visual disturbances, states she feels \"back to normal\" does co of occasional cramping in her lower back. No other co's.

## 2025-07-07 ENCOUNTER — HOSPITAL ENCOUNTER (OUTPATIENT)
Age: 30
Discharge: HOME | End: 2025-07-07
Attending: OBSTETRICS & GYNECOLOGY | Admitting: OBSTETRICS & GYNECOLOGY
Payer: MEDICAID

## 2025-07-07 VITALS
RESPIRATION RATE: 15 BRPM | DIASTOLIC BLOOD PRESSURE: 76 MMHG | WEIGHT: 154 LBS | HEIGHT: 67 IN | SYSTOLIC BLOOD PRESSURE: 131 MMHG | TEMPERATURE: 98.1 F | BODY MASS INDEX: 24.17 KG/M2 | HEART RATE: 95 BPM

## 2025-07-07 PROBLEM — O26.90 COMPLICATED PREGNANCY: Status: ACTIVE | Noted: 2025-07-07

## 2025-07-07 LAB
ALBUMIN SERPL-MCNC: 3.7 G/DL (ref 3.5–5.2)
ALP SERPL-CCNC: 74 U/L (ref 35–104)
ALT SERPL-CCNC: 9 U/L (ref 0–35)
ANION GAP SERPL CALCULATED.3IONS-SCNC: 5 MMOL/L (ref 7–16)
AST SERPL-CCNC: 15 U/L (ref 0–35)
BASOPHILS # BLD: 0.07 K/UL (ref 0–0.2)
BASOPHILS NFR BLD: 1 % (ref 0–2)
BILIRUB SERPL-MCNC: 0.3 MG/DL (ref 0–1.2)
BILIRUB UR QL STRIP: NEGATIVE
BUN SERPL-MCNC: 6 MG/DL (ref 6–20)
CALCIUM SERPL-MCNC: 8.4 MG/DL (ref 8.6–10)
CHLORIDE SERPL-SCNC: 108 MMOL/L (ref 98–107)
CLARITY UR: CLEAR
CO2 SERPL-SCNC: 26 MMOL/L (ref 22–29)
COLOR UR: YELLOW
CREAT SERPL-MCNC: 0.5 MG/DL (ref 0.5–1)
EOSINOPHIL # BLD: 0.11 K/UL (ref 0.05–0.5)
EOSINOPHILS RELATIVE PERCENT: 2 % (ref 0–6)
EPI CELLS #/AREA URNS HPF: ABNORMAL /HPF
ERYTHROCYTE [DISTWIDTH] IN BLOOD BY AUTOMATED COUNT: 12.7 % (ref 11.5–15)
GFR, ESTIMATED: >90 ML/MIN/1.73M2
GLUCOSE SERPL-MCNC: 97 MG/DL (ref 74–99)
GLUCOSE UR STRIP-MCNC: NEGATIVE MG/DL
HCT VFR BLD AUTO: 27.7 % (ref 34–48)
HGB BLD-MCNC: 9.1 G/DL (ref 11.5–15.5)
HGB UR QL STRIP.AUTO: NEGATIVE
IMM GRANULOCYTES # BLD AUTO: 0.04 K/UL (ref 0–0.58)
IMM GRANULOCYTES NFR BLD: 1 % (ref 0–5)
KETONES UR STRIP-MCNC: NEGATIVE MG/DL
LEUKOCYTE ESTERASE UR QL STRIP: ABNORMAL
LYMPHOCYTES NFR BLD: 2.3 K/UL (ref 1.5–4)
LYMPHOCYTES RELATIVE PERCENT: 36 % (ref 20–42)
MCH RBC QN AUTO: 28.3 PG (ref 26–35)
MCHC RBC AUTO-ENTMCNC: 32.9 G/DL (ref 32–34.5)
MCV RBC AUTO: 86.3 FL (ref 80–99.9)
MONOCYTES NFR BLD: 0.46 K/UL (ref 0.1–0.95)
MONOCYTES NFR BLD: 7 % (ref 2–12)
NEUTROPHILS NFR BLD: 54 % (ref 43–80)
NEUTS SEG NFR BLD: 3.45 K/UL (ref 1.8–7.3)
NITRITE UR QL STRIP: NEGATIVE
PH UR STRIP: 7.5 [PH] (ref 5–8)
PLATELET # BLD AUTO: 205 K/UL (ref 130–450)
PMV BLD AUTO: 11.1 FL (ref 7–12)
POTASSIUM SERPL-SCNC: 3.6 MMOL/L (ref 3.5–5.1)
PROT SERPL-MCNC: 5.6 G/DL (ref 6.4–8.3)
PROT UR STRIP-MCNC: NEGATIVE MG/DL
RBC # BLD AUTO: 3.21 M/UL (ref 3.5–5.5)
RBC #/AREA URNS HPF: ABNORMAL /HPF
SODIUM SERPL-SCNC: 139 MMOL/L (ref 136–145)
SP GR UR STRIP: 1.01 (ref 1–1.03)
URATE SERPL-MCNC: 3.4 MG/DL (ref 2.4–5.7)
UROBILINOGEN UR STRIP-ACNC: 0.2 EU/DL (ref 0–1)
WBC #/AREA URNS HPF: ABNORMAL /HPF
WBC OTHER # BLD: 6.4 K/UL (ref 4.5–11.5)

## 2025-07-07 PROCEDURE — 96360 HYDRATION IV INFUSION INIT: CPT

## 2025-07-07 PROCEDURE — 85025 COMPLETE CBC W/AUTO DIFF WBC: CPT

## 2025-07-07 PROCEDURE — 80053 COMPREHEN METABOLIC PANEL: CPT

## 2025-07-07 PROCEDURE — 96372 THER/PROPH/DIAG INJ SC/IM: CPT

## 2025-07-07 PROCEDURE — 6360000002 HC RX W HCPCS: Performed by: OBSTETRICS & GYNECOLOGY

## 2025-07-07 PROCEDURE — 96361 HYDRATE IV INFUSION ADD-ON: CPT

## 2025-07-07 PROCEDURE — 81001 URINALYSIS AUTO W/SCOPE: CPT

## 2025-07-07 PROCEDURE — 84550 ASSAY OF BLOOD/URIC ACID: CPT

## 2025-07-07 PROCEDURE — 99211 OFF/OP EST MAY X REQ PHY/QHP: CPT

## 2025-07-07 PROCEDURE — 6370000000 HC RX 637 (ALT 250 FOR IP): Performed by: OBSTETRICS & GYNECOLOGY

## 2025-07-07 RX ORDER — TERBUTALINE SULFATE 1 MG/ML
0.25 INJECTION SUBCUTANEOUS
Status: COMPLETED | OUTPATIENT
Start: 2025-07-07 | End: 2025-07-07

## 2025-07-07 RX ORDER — ACETAMINOPHEN 500 MG
1000 TABLET ORAL EVERY 8 HOURS PRN
Status: DISCONTINUED | OUTPATIENT
Start: 2025-07-07 | End: 2025-07-07 | Stop reason: HOSPADM

## 2025-07-07 RX ADMIN — ACETAMINOPHEN 1000 MG: 500 TABLET ORAL at 14:32

## 2025-07-07 RX ADMIN — TERBUTALINE SULFATE 0.25 MG: 1 INJECTION, SOLUTION SUBCUTANEOUS at 08:38

## 2025-07-07 RX ADMIN — TERBUTALINE SULFATE 0.25 MG: 1 INJECTION, SOLUTION SUBCUTANEOUS at 08:02

## 2025-07-07 ASSESSMENT — PAIN SCALES - GENERAL: PAINLEVEL_OUTOF10: 8

## 2025-07-07 NOTE — H&P
hematemesis  Genito-Urinary ROS: negative for -  genital discharge, genital ulcers or hematuria  Musculoskeletal ROS: negative for - gait disturbance, muscle pain or muscular weakness       Objective:      /76   Pulse 95   Temp 98.1 °F (36.7 °C) (Oral)   Resp 15   Ht 1.702 m (5' 7\")   Wt 69.9 kg (154 lb)   BMI 24.12 kg/m²   Blood pressure 131/76, pulse 95, temperature 98.1 °F (36.7 °C), temperature source Oral, resp. rate 15, height 1.702 m (5' 7\"), weight 69.9 kg (154 lb), unknown if currently breastfeeding.    General appearance:  NAD  Pyscho/social: neg for tremors and hallucinations  Head: NCAT, PERRLA, EOMI, red conjunctiva  Neck: supple, no masses  Lungs: CTAB, equal chest rise bilateral  Heart: Reg rate  Abdomen: soft, non tender and non distended  Skin; no lesions  Gu: no cva tenderness  Extremities: extremities normal, atraumatic, no cyanosis or edema    Fetal heart rate:       Baseline Heart Rate: 130       Accelerations:  present       Long Term Variability:  moderate       Decelerations:  absent         Contraction frequency: Occasional post Brethine    Membranes:  Intact    Cervix:         Dilation:  Finger tip         Effacement:  Long         Station:  -3         Position:  posterior  Lab Review    CBC:   Lab Results   Component Value Date/Time    WBC 6.4 07/07/2025 07:20 AM    RBC 3.21 07/07/2025 07:20 AM    HGB 9.1 07/07/2025 07:20 AM    HCT 27.7 07/07/2025 07:20 AM    MCV 86.3 07/07/2025 07:20 AM    MCH 28.3 07/07/2025 07:20 AM    MCHC 32.9 07/07/2025 07:20 AM    RDW 12.7 07/07/2025 07:20 AM     07/07/2025 07:20 AM    MPV 11.1 07/07/2025 07:20 AM     CMP:    Lab Results   Component Value Date/Time     07/07/2025 07:20 AM    K 3.6 07/07/2025 07:20 AM     07/07/2025 07:20 AM    CO2 26 07/07/2025 07:20 AM    BUN 6 07/07/2025 07:20 AM    CREATININE 0.5 07/07/2025 07:20 AM    GFRAA >60 01/12/2022 08:10 PM    LABGLOM >90 07/07/2025 07:20 AM    LABGLOM >60 11/19/2022 04:47

## 2025-07-07 NOTE — PROGRESS NOTES
Provided patient with discharge instructions including education on  labor and irena sung contractions.  Answered all questions and patient left by ambulation by self.

## 2025-07-07 NOTE — PROGRESS NOTES
Patient arrived to unit stating upper GI pain. No bleeding or LOF noted. Positive fetal movement. No blurred vision, H/A or swelling. Patient placed on monitor. SVE performed. Dr. Villalobos notified of patient arrival and assessment. Orders received.

## 2025-07-09 ENCOUNTER — HOSPITAL ENCOUNTER (OUTPATIENT)
Age: 30
Discharge: HOME | End: 2025-07-09
Attending: OBSTETRICS & GYNECOLOGY | Admitting: OBSTETRICS & GYNECOLOGY
Payer: MEDICAID

## 2025-07-09 VITALS
SYSTOLIC BLOOD PRESSURE: 133 MMHG | TEMPERATURE: 98.5 F | OXYGEN SATURATION: 99 % | DIASTOLIC BLOOD PRESSURE: 72 MMHG | RESPIRATION RATE: 17 BRPM | HEART RATE: 87 BPM

## 2025-07-09 PROBLEM — Z03.71 ENCOUNTER FOR SUSPECTED PREMATURE RUPTURE OF MEMBRANES, WITH RUPTURE OF MEMBRANES NOT FOUND: Status: ACTIVE | Noted: 2025-07-09

## 2025-07-09 PROBLEM — O23.599 BACTERIAL VAGINOSIS IN PREGNANCY: Status: ACTIVE | Noted: 2025-07-09

## 2025-07-09 PROBLEM — O26.93 COMPLICATED PREGNANCY, THIRD TRIMESTER: Status: ACTIVE | Noted: 2025-07-09

## 2025-07-09 PROBLEM — B96.89 BACTERIAL VAGINOSIS IN PREGNANCY: Status: ACTIVE | Noted: 2025-07-09

## 2025-07-09 LAB
AMNISURE, POC: NEGATIVE
BILIRUB UR QL STRIP: NEGATIVE
CLARITY UR: CLEAR
CLUE CELLS VAG QL WET PREP: ABNORMAL
COLOR UR: YELLOW
EPI CELLS #/AREA URNS HPF: ABNORMAL /HPF
GLUCOSE UR STRIP-MCNC: NEGATIVE MG/DL
HGB UR QL STRIP.AUTO: NEGATIVE
KETONES UR STRIP-MCNC: NEGATIVE MG/DL
LEUKOCYTE ESTERASE UR QL STRIP: NEGATIVE
Lab: NORMAL
NEGATIVE QC PASS/FAIL: NORMAL
NITRITE UR QL STRIP: NEGATIVE
PH UR STRIP: 7 [PH] (ref 5–8)
POSITIVE QC PASS/FAIL: NORMAL
PROT UR STRIP-MCNC: NEGATIVE MG/DL
RBC #/AREA URNS HPF: ABNORMAL /HPF
SOURCE WET PREP: ABNORMAL
SP GR UR STRIP: <1.005 (ref 1–1.03)
T VAGINALIS VAG QL WET PREP: ABNORMAL
UROBILINOGEN UR STRIP-ACNC: 0.2 EU/DL (ref 0–1)
WBC #/AREA URNS HPF: ABNORMAL /HPF
YEAST WET PREP: ABNORMAL

## 2025-07-09 PROCEDURE — 87210 SMEAR WET MOUNT SALINE/INK: CPT

## 2025-07-09 PROCEDURE — 81001 URINALYSIS AUTO W/SCOPE: CPT

## 2025-07-09 PROCEDURE — 84112 EVAL AMNIOTIC FLUID PROTEIN: CPT

## 2025-07-09 PROCEDURE — 6370000000 HC RX 637 (ALT 250 FOR IP): Performed by: OBSTETRICS & GYNECOLOGY

## 2025-07-09 RX ORDER — ACETAMINOPHEN 650 MG/1
650 SUPPOSITORY RECTAL EVERY 8 HOURS
COMMUNITY

## 2025-07-09 RX ORDER — CLINDAMYCIN HYDROCHLORIDE 300 MG/1
300 CAPSULE ORAL 3 TIMES DAILY
Qty: 21 CAPSULE | Refills: 0 | Status: SHIPPED | OUTPATIENT
Start: 2025-07-09 | End: 2025-07-09

## 2025-07-09 RX ORDER — CLINDAMYCIN HYDROCHLORIDE 300 MG/1
300 CAPSULE ORAL 3 TIMES DAILY
Qty: 20 CAPSULE | Refills: 0 | Status: SHIPPED | OUTPATIENT
Start: 2025-07-09 | End: 2025-07-16

## 2025-07-09 RX ORDER — CLINDAMYCIN HYDROCHLORIDE 150 MG/1
300 CAPSULE ORAL ONCE
Status: COMPLETED | OUTPATIENT
Start: 2025-07-09 | End: 2025-07-09

## 2025-07-09 RX ADMIN — CLINDAMYCIN HYDROCHLORIDE 300 MG: 150 CAPSULE ORAL at 16:29

## 2025-07-09 NOTE — PROGRESS NOTES
33W 3D   EDC 2025  Ambulatory patient in with c/o   ?ROM and outside vaginal soreness.   Meternal perception of fetal movement noted. Pt denies bleeding, leaking of fluids or contractions. Call light within reach

## 2025-07-09 NOTE — PROGRESS NOTES
Informed Dr. Workman of results, ordered for Cleocin.  Gave patient a dose and provided patient with discharge instructions.  Answered all questions.  Patient left by ambulation with sig other.

## 2025-07-09 NOTE — H&P
03:27 PM    PHUR 7.0 07/09/2025 03:27 PM    WBCUA PENDING 07/09/2025 03:27 PM    RBCUA PENDING 07/09/2025 03:27 PM    MUCUS PENDING 07/09/2025 03:27 PM    TRICHOMONAS PENDING 07/09/2025 03:27 PM    YEAST PENDING 07/09/2025 03:27 PM    BACTERIA PENDING 07/09/2025 03:27 PM    LEUKOCYTESUR NEGATIVE 07/09/2025 03:27 PM    UROBILINOGEN 0.2 07/09/2025 03:27 PM    BILIRUBINUR NEGATIVE 07/09/2025 03:27 PM    GLUCOSEU NEGATIVE 07/09/2025 03:27 PM    AMORPHOUS PENDING 07/09/2025 03:27 PM     AmniSure: Negative  Wet prep: Positive for clue cells       Assessment:     33 and 3/7 weeks gestation   Bacterial vaginosis  Unconfirmed rupture of membranes    Plan:     Cleocin 300 mg p.o. 3 times daily  Discharge home  Follow-up in office as scheduled    Brianna Workman MD

## 2025-07-28 ENCOUNTER — HOSPITAL ENCOUNTER (OUTPATIENT)
Age: 30
Discharge: HOME | End: 2025-07-28
Attending: OBSTETRICS & GYNECOLOGY | Admitting: OBSTETRICS & GYNECOLOGY
Payer: MEDICAID

## 2025-07-28 VITALS
HEART RATE: 88 BPM | SYSTOLIC BLOOD PRESSURE: 110 MMHG | DIASTOLIC BLOOD PRESSURE: 67 MMHG | TEMPERATURE: 98.5 F | RESPIRATION RATE: 16 BRPM

## 2025-07-28 PROBLEM — O26.90 PREGNANCY, COMPLICATED: Status: ACTIVE | Noted: 2025-07-28

## 2025-07-28 LAB
BACTERIA URNS QL MICRO: ABNORMAL
BILIRUB UR QL STRIP: NEGATIVE
CLARITY UR: CLEAR
COLOR UR: YELLOW
EPI CELLS #/AREA URNS HPF: ABNORMAL /HPF
GLUCOSE UR STRIP-MCNC: NEGATIVE MG/DL
HGB UR QL STRIP.AUTO: NEGATIVE
KETONES UR STRIP-MCNC: NEGATIVE MG/DL
LEUKOCYTE ESTERASE UR QL STRIP: ABNORMAL
NITRITE UR QL STRIP: NEGATIVE
PH UR STRIP: 6.5 [PH] (ref 5–8)
PROT UR STRIP-MCNC: NEGATIVE MG/DL
RBC #/AREA URNS HPF: ABNORMAL /HPF
SP GR UR STRIP: <1.005 (ref 1–1.03)
UROBILINOGEN UR STRIP-ACNC: 0.2 EU/DL (ref 0–1)
WBC #/AREA URNS HPF: ABNORMAL /HPF

## 2025-07-28 PROCEDURE — 99211 OFF/OP EST MAY X REQ PHY/QHP: CPT

## 2025-07-28 PROCEDURE — 81001 URINALYSIS AUTO W/SCOPE: CPT

## 2025-07-29 NOTE — PROGRESS NOTES
Discharge instructions reviewed with patient; discharge form signed by patient and placed in soft chart.

## 2025-07-29 NOTE — PROGRESS NOTES
36w1d AI 25  ambulatory to L&D floor with complaints of contractions every 5-10 minutes that started around 1545 and lasted roughly 1.5hrs. Reports that she then felt one contraction on her way to the hospital. Denies any bleeding or leaking of fluids. Maternal perception of fetal movements noted. Call light in reach.

## 2025-07-29 NOTE — H&P
Subjective:      Sandra Levy is an 30 y.o. female at 36 and 1/7 weeks gestation presenting with lower abdominal pains.  Denies any bleeding or fluid leak  She is also complaining of contractions every a few minutes lasting few seconds. Other associated symptoms include low back pain. Fetal Movement: normal.  Patient's medications, allergies, past medical, surgical, social and family histories were reviewed and updated as appropriate.  Social History:  TOBACCO:   reports that she has quit smoking. She uses smokeless tobacco.  ETOH:   reports that she does not currently use alcohol.  DRUGS:   reports no history of drug use.    Past Medical History:  Past Medical History:   Diagnosis Date    Pregnancy headache in third trimester 2025     (spontaneous vaginal delivery) 2022         Past Surgical History:      Procedure Laterality Date    APPENDECTOMY         Family History:   History reviewed. No pertinent family history.    meds:No current facility-administered medications for this encounter.       Allergies:  Flagyl [metronidazole]    Review of Systems  Review of Systems -  General ROS: negative for - chills, fatigue or malaise  ENT ROS: negative for - hearing change, nasal congestion or nasal discharge  Allergy and Immunology ROS: negative for - hives, itchy/watery eyes or nasal congestion  Hematological and Lymphatic ROS: negative for - blood clots, blood transfusions, bruising or fatigue  Endocrine ROS: negative for - malaise/lethargy, mood swings, palpitations or polydipsia/polyuria  Breast ROS: negative for - new or changing breast lumps or nipple changes  Respiratory ROS: negative for - sputum changes, stridor, tachypnea or wheezing  Cardiovascular ROS: negative for - irregular heartbeat, loss of consciousness, murmur or orthopnea  Gastrointestinal ROS: negative for - constipation, diarrhea, gas/bloating, heartburn or hematemesis  Genito-Urinary ROS: negative for -  genital discharge, genital

## 2025-07-29 NOTE — DISCHARGE INSTRUCTIONS
number of times you feel your baby move.  Follow-up care is a key part of your treatment and safety. Be sure to make and go to all appointments, and call your doctor if you are having problems. It's also a good idea to know your test results and keep a list of the medicines you take.  How do you count fetal kicks?  A common method of checking your baby's movement is to note the length of time it takes to count 10 movements (such as kicks, flutters, or rolls).  Pick your baby's most active time of day to count. This may be any time from morning to evening.  If you don't feel 10 movements in an hour, have something to eat or drink and count for another hour. If you don't feel at least 10 movements in the 2-hour period, call your doctor.  Do not use an at-home Doppler heart monitor in place of counting fetal movements.  When should you call for help?   Call your doctor now or seek immediate medical care if:    You feel fewer than 10 movements in a 2-hour period.     You noticed that your baby has stopped moving or is moving less than normal.   Watch closely for changes in your health, and be sure to contact your doctor if you have any problems.  Where can you learn more?  Go to https://www.[email or web address].net/patientEd and enter U048 to learn more about \"Counting Your Baby's Kicks: Care Instructions.\"  Current as of: April 30, 2024  Content Version: 14.5  © 8676-7655 [employer].   Care instructions adapted under license by [employer]. If you have questions about a medical condition or this instruction, always ask your healthcare professional. [employer], disclaims any warranty or liability for your use of this information.

## 2025-08-13 ENCOUNTER — HOSPITAL ENCOUNTER (OUTPATIENT)
Age: 30
Discharge: HOME | End: 2025-08-13
Attending: OBSTETRICS & GYNECOLOGY | Admitting: OBSTETRICS & GYNECOLOGY
Payer: MEDICAID

## 2025-08-13 VITALS
SYSTOLIC BLOOD PRESSURE: 121 MMHG | RESPIRATION RATE: 18 BRPM | DIASTOLIC BLOOD PRESSURE: 71 MMHG | HEART RATE: 82 BPM | TEMPERATURE: 98.4 F

## 2025-08-13 PROBLEM — Z3A.38 38 WEEKS GESTATION OF PREGNANCY: Status: ACTIVE | Noted: 2025-08-13

## 2025-08-13 PROCEDURE — 59025 FETAL NON-STRESS TEST: CPT

## 2025-08-19 ENCOUNTER — HOSPITAL ENCOUNTER (OUTPATIENT)
Age: 30
Discharge: HOME | End: 2025-08-19
Attending: OBSTETRICS & GYNECOLOGY | Admitting: OBSTETRICS & GYNECOLOGY
Payer: MEDICAID

## 2025-08-19 ENCOUNTER — APPOINTMENT (OUTPATIENT)
Dept: LABOR AND DELIVERY | Age: 30
End: 2025-08-19
Payer: MEDICAID

## 2025-08-19 PROBLEM — Z3A.39 39 WEEKS GESTATION OF PREGNANCY: Status: ACTIVE | Noted: 2025-08-19

## 2025-08-19 PROCEDURE — 59025 FETAL NON-STRESS TEST: CPT

## 2025-08-21 ENCOUNTER — HOSPITAL ENCOUNTER (OUTPATIENT)
Age: 30
Discharge: HOME | End: 2025-08-21
Attending: OBSTETRICS & GYNECOLOGY | Admitting: OBSTETRICS & GYNECOLOGY
Payer: MEDICAID

## 2025-08-21 VITALS
DIASTOLIC BLOOD PRESSURE: 74 MMHG | TEMPERATURE: 98.2 F | SYSTOLIC BLOOD PRESSURE: 119 MMHG | HEART RATE: 71 BPM | OXYGEN SATURATION: 98 % | RESPIRATION RATE: 17 BRPM

## 2025-08-21 PROBLEM — O36.8190 DECREASED FETAL MOVEMENT AFFECTING PREGNANCY, ANTEPARTUM: Status: ACTIVE | Noted: 2025-08-21

## 2025-08-21 LAB
BILIRUB UR QL STRIP: NEGATIVE
CLARITY UR: CLEAR
COLOR UR: YELLOW
GLUCOSE UR STRIP-MCNC: NEGATIVE MG/DL
HGB UR QL STRIP.AUTO: NEGATIVE
KETONES UR STRIP-MCNC: NEGATIVE MG/DL
LEUKOCYTE ESTERASE UR QL STRIP: NEGATIVE
NITRITE UR QL STRIP: NEGATIVE
PH UR STRIP: 6.5 [PH] (ref 5–8)
PROT UR STRIP-MCNC: NEGATIVE MG/DL
RBC #/AREA URNS HPF: ABNORMAL /HPF
SP GR UR STRIP: <1.005 (ref 1–1.03)
UROBILINOGEN UR STRIP-ACNC: 0.2 EU/DL (ref 0–1)
WBC #/AREA URNS HPF: ABNORMAL /HPF

## 2025-08-21 PROCEDURE — 81001 URINALYSIS AUTO W/SCOPE: CPT

## 2025-08-21 PROCEDURE — 99211 OFF/OP EST MAY X REQ PHY/QHP: CPT

## 2025-08-21 PROCEDURE — 59025 FETAL NON-STRESS TEST: CPT

## 2025-08-22 ENCOUNTER — HOSPITAL ENCOUNTER (OUTPATIENT)
Age: 30
Discharge: HOME | End: 2025-08-22
Attending: OBSTETRICS & GYNECOLOGY | Admitting: OBSTETRICS & GYNECOLOGY
Payer: MEDICAID

## 2025-08-22 VITALS — DIASTOLIC BLOOD PRESSURE: 77 MMHG | HEART RATE: 76 BPM | SYSTOLIC BLOOD PRESSURE: 124 MMHG | RESPIRATION RATE: 16 BRPM

## 2025-08-22 PROBLEM — Z34.93 NORMAL PREGNANCY, THIRD TRIMESTER: Status: ACTIVE | Noted: 2025-08-22

## 2025-08-22 PROCEDURE — 59025 FETAL NON-STRESS TEST: CPT

## 2025-08-26 ENCOUNTER — HOSPITAL ENCOUNTER (OUTPATIENT)
Age: 30
Discharge: HOME | DRG: 560 | End: 2025-08-26
Attending: OBSTETRICS & GYNECOLOGY | Admitting: OBSTETRICS & GYNECOLOGY
Payer: MEDICAID

## 2025-08-26 VITALS — HEIGHT: 67 IN | WEIGHT: 161 LBS | BODY MASS INDEX: 25.27 KG/M2

## 2025-08-26 LAB
AMNISURE, POC: NEGATIVE
Lab: NORMAL
NEGATIVE QC PASS/FAIL: NORMAL
POSITIVE QC PASS/FAIL: NORMAL

## 2025-08-26 PROCEDURE — 99212 OFFICE O/P EST SF 10 MIN: CPT

## 2025-08-26 PROCEDURE — 84112 EVAL AMNIOTIC FLUID PROTEIN: CPT

## 2025-08-26 RX ORDER — CALCIUM CARBONATE 500 MG/1
1 TABLET, CHEWABLE ORAL DAILY
COMMUNITY

## 2025-08-26 RX ORDER — ONDANSETRON 4 MG/1
4 TABLET, FILM COATED ORAL EVERY 8 HOURS PRN
Status: ON HOLD | COMMUNITY
End: 2025-08-28 | Stop reason: HOSPADM

## 2025-08-27 ENCOUNTER — APPOINTMENT (OUTPATIENT)
Dept: LABOR AND DELIVERY | Age: 30
DRG: 560 | End: 2025-08-27
Payer: MEDICAID

## 2025-08-27 ENCOUNTER — ANESTHESIA EVENT (OUTPATIENT)
Dept: LABOR AND DELIVERY | Age: 30
End: 2025-08-27
Payer: MEDICAID

## 2025-08-27 ENCOUNTER — ANESTHESIA (OUTPATIENT)
Dept: LABOR AND DELIVERY | Age: 30
End: 2025-08-27
Payer: MEDICAID

## 2025-08-27 ENCOUNTER — HOSPITAL ENCOUNTER (INPATIENT)
Age: 30
LOS: 2 days | Discharge: HOME | DRG: 560 | End: 2025-08-29
Attending: OBSTETRICS & GYNECOLOGY | Admitting: OBSTETRICS & GYNECOLOGY
Payer: MEDICAID

## 2025-08-27 PROBLEM — Z34.90 TERM PREGNANCY: Status: ACTIVE | Noted: 2025-08-27

## 2025-08-27 LAB
ABO + RH BLD: NORMAL
ALBUMIN SERPL-MCNC: 3.6 G/DL (ref 3.5–5.2)
ALP SERPL-CCNC: 108 U/L (ref 35–104)
ALT SERPL-CCNC: 9 U/L (ref 0–35)
AMPHET UR QL SCN: NEGATIVE
ANION GAP SERPL CALCULATED.3IONS-SCNC: 14 MMOL/L (ref 7–16)
ARM BAND NUMBER: NORMAL
AST SERPL-CCNC: 18 U/L (ref 0–35)
BARBITURATES UR QL SCN: NEGATIVE
BENZODIAZ UR QL: NEGATIVE
BILIRUB SERPL-MCNC: 0.9 MG/DL (ref 0–1.2)
BLOOD BANK SAMPLE EXPIRATION: NORMAL
BLOOD GROUP ANTIBODIES SERPL: NEGATIVE
BUN SERPL-MCNC: 9 MG/DL (ref 6–20)
BUPRENORPHINE UR QL: NEGATIVE
CALCIUM SERPL-MCNC: 8.9 MG/DL (ref 8.6–10)
CANNABINOIDS UR QL SCN: NEGATIVE
CHLORIDE SERPL-SCNC: 105 MMOL/L (ref 98–107)
CO2 SERPL-SCNC: 19 MMOL/L (ref 22–29)
COCAINE UR QL SCN: NEGATIVE
CREAT SERPL-MCNC: 0.5 MG/DL (ref 0.5–1)
ERYTHROCYTE [DISTWIDTH] IN BLOOD BY AUTOMATED COUNT: 13.7 % (ref 11.5–15)
FENTANYL UR QL: NEGATIVE
GFR, ESTIMATED: >90 ML/MIN/1.73M2
GLUCOSE SERPL-MCNC: 103 MG/DL (ref 74–99)
HCT VFR BLD AUTO: 27.6 % (ref 34–48)
HGB BLD-MCNC: 9.1 G/DL (ref 11.5–15.5)
MCH RBC QN AUTO: 27.7 PG (ref 26–35)
MCHC RBC AUTO-ENTMCNC: 33 G/DL (ref 32–34.5)
MCV RBC AUTO: 84.1 FL (ref 80–99.9)
METHADONE UR QL: NEGATIVE
OPIATES UR QL SCN: NEGATIVE
OXYCODONE UR QL SCN: NEGATIVE
PCP UR QL SCN: NEGATIVE
PLATELET # BLD AUTO: 169 K/UL (ref 130–450)
PMV BLD AUTO: 11.7 FL (ref 7–12)
POTASSIUM SERPL-SCNC: 3.4 MMOL/L (ref 3.5–5.1)
PROT SERPL-MCNC: 5.9 G/DL (ref 6.4–8.3)
RBC # BLD AUTO: 3.28 M/UL (ref 3.5–5.5)
SODIUM SERPL-SCNC: 138 MMOL/L (ref 136–145)
T PALLIDUM AB SER QL IA: NONREACTIVE
TEST INFORMATION: NORMAL
WBC OTHER # BLD: 6.4 K/UL (ref 4.5–11.5)

## 2025-08-27 PROCEDURE — 1220000001 HC SEMI PRIVATE L&D R&B

## 2025-08-27 PROCEDURE — 86850 RBC ANTIBODY SCREEN: CPT

## 2025-08-27 PROCEDURE — 80053 COMPREHEN METABOLIC PANEL: CPT

## 2025-08-27 PROCEDURE — 6370000000 HC RX 637 (ALT 250 FOR IP): Performed by: OBSTETRICS & GYNECOLOGY

## 2025-08-27 PROCEDURE — 80307 DRUG TEST PRSMV CHEM ANLYZR: CPT

## 2025-08-27 PROCEDURE — 85027 COMPLETE CBC AUTOMATED: CPT

## 2025-08-27 PROCEDURE — 3700000025 EPIDURAL BLOCK: Performed by: ANESTHESIOLOGY

## 2025-08-27 PROCEDURE — 2500000003 HC RX 250 WO HCPCS: Performed by: ANESTHESIOLOGY

## 2025-08-27 PROCEDURE — 7200000001 HC VAGINAL DELIVERY

## 2025-08-27 PROCEDURE — 86900 BLOOD TYPING SEROLOGIC ABO: CPT

## 2025-08-27 PROCEDURE — 86780 TREPONEMA PALLIDUM: CPT

## 2025-08-27 PROCEDURE — 86901 BLOOD TYPING SEROLOGIC RH(D): CPT

## 2025-08-27 PROCEDURE — 2580000003 HC RX 258: Performed by: OBSTETRICS & GYNECOLOGY

## 2025-08-27 PROCEDURE — 3E033VJ INTRODUCTION OF OTHER HORMONE INTO PERIPHERAL VEIN, PERCUTANEOUS APPROACH: ICD-10-PCS | Performed by: OBSTETRICS & GYNECOLOGY

## 2025-08-27 PROCEDURE — 6360000002 HC RX W HCPCS: Performed by: OBSTETRICS & GYNECOLOGY

## 2025-08-27 RX ORDER — ACETAMINOPHEN 500 MG
1000 TABLET ORAL EVERY 8 HOURS
Status: DISCONTINUED | OUTPATIENT
Start: 2025-08-27 | End: 2025-08-29 | Stop reason: HOSPADM

## 2025-08-27 RX ORDER — IBUPROFEN 800 MG/1
800 TABLET, FILM COATED ORAL EVERY 8 HOURS
Status: DISCONTINUED | OUTPATIENT
Start: 2025-08-27 | End: 2025-08-29 | Stop reason: HOSPADM

## 2025-08-27 RX ORDER — SODIUM CHLORIDE 0.9 % (FLUSH) 0.9 %
5-40 SYRINGE (ML) INJECTION EVERY 12 HOURS SCHEDULED
Status: DISCONTINUED | OUTPATIENT
Start: 2025-08-27 | End: 2025-08-29 | Stop reason: HOSPADM

## 2025-08-27 RX ORDER — TERBUTALINE SULFATE 1 MG/ML
0.25 INJECTION SUBCUTANEOUS
Status: DISCONTINUED | OUTPATIENT
Start: 2025-08-27 | End: 2025-08-27

## 2025-08-27 RX ORDER — SODIUM CHLORIDE 0.9 % (FLUSH) 0.9 %
5-40 SYRINGE (ML) INJECTION EVERY 12 HOURS SCHEDULED
Status: DISCONTINUED | OUTPATIENT
Start: 2025-08-27 | End: 2025-08-27

## 2025-08-27 RX ORDER — SODIUM CHLORIDE, SODIUM LACTATE, POTASSIUM CHLORIDE, AND CALCIUM CHLORIDE .6; .31; .03; .02 G/100ML; G/100ML; G/100ML; G/100ML
500 INJECTION, SOLUTION INTRAVENOUS PRN
Status: DISCONTINUED | OUTPATIENT
Start: 2025-08-27 | End: 2025-08-27

## 2025-08-27 RX ORDER — SODIUM CHLORIDE 9 MG/ML
INJECTION, SOLUTION INTRAVENOUS PRN
Status: DISCONTINUED | OUTPATIENT
Start: 2025-08-27 | End: 2025-08-29 | Stop reason: HOSPADM

## 2025-08-27 RX ORDER — SODIUM CHLORIDE, SODIUM LACTATE, POTASSIUM CHLORIDE, CALCIUM CHLORIDE 600; 310; 30; 20 MG/100ML; MG/100ML; MG/100ML; MG/100ML
INJECTION, SOLUTION INTRAVENOUS
Status: DISCONTINUED | OUTPATIENT
Start: 2025-08-27 | End: 2025-08-27

## 2025-08-27 RX ORDER — LIDOCAINE HYDROCHLORIDE 10 MG/ML
INJECTION, SOLUTION INFILTRATION; PERINEURAL
Status: DISCONTINUED
Start: 2025-08-27 | End: 2025-08-27

## 2025-08-27 RX ORDER — CITRIC ACID/SODIUM CITRATE 334-500MG
30 SOLUTION, ORAL ORAL
Status: DISCONTINUED | OUTPATIENT
Start: 2025-08-27 | End: 2025-08-27

## 2025-08-27 RX ORDER — ONDANSETRON 2 MG/ML
4 INJECTION INTRAMUSCULAR; INTRAVENOUS EVERY 6 HOURS PRN
Status: DISCONTINUED | OUTPATIENT
Start: 2025-08-27 | End: 2025-08-29 | Stop reason: HOSPADM

## 2025-08-27 RX ORDER — ONDANSETRON 2 MG/ML
4 INJECTION INTRAMUSCULAR; INTRAVENOUS EVERY 6 HOURS PRN
Status: DISCONTINUED | OUTPATIENT
Start: 2025-08-27 | End: 2025-08-27

## 2025-08-27 RX ORDER — MODIFIED LANOLIN
OINTMENT (GRAM) TOPICAL PRN
Status: DISCONTINUED | OUTPATIENT
Start: 2025-08-27 | End: 2025-08-29 | Stop reason: HOSPADM

## 2025-08-27 RX ORDER — EPHEDRINE SULFATE/0.9% NACL/PF 25 MG/5 ML
10 SYRINGE (ML) INTRAVENOUS
Status: DISCONTINUED | OUTPATIENT
Start: 2025-08-27 | End: 2025-08-27

## 2025-08-27 RX ORDER — ONDANSETRON 4 MG/1
4 TABLET, ORALLY DISINTEGRATING ORAL EVERY 6 HOURS PRN
Status: DISCONTINUED | OUTPATIENT
Start: 2025-08-27 | End: 2025-08-29 | Stop reason: HOSPADM

## 2025-08-27 RX ORDER — ONDANSETRON 4 MG/1
4 TABLET, ORALLY DISINTEGRATING ORAL EVERY 8 HOURS PRN
Status: DISCONTINUED | OUTPATIENT
Start: 2025-08-27 | End: 2025-08-27

## 2025-08-27 RX ORDER — CARBOPROST TROMETHAMINE 250 UG/ML
250 INJECTION, SOLUTION INTRAMUSCULAR PRN
Status: DISCONTINUED | OUTPATIENT
Start: 2025-08-27 | End: 2025-08-27

## 2025-08-27 RX ORDER — MISOPROSTOL 200 UG/1
400 TABLET ORAL PRN
Status: DISCONTINUED | OUTPATIENT
Start: 2025-08-27 | End: 2025-08-27

## 2025-08-27 RX ORDER — METHYLERGONOVINE MALEATE 0.2 MG/ML
200 INJECTION INTRAVENOUS PRN
Status: DISCONTINUED | OUTPATIENT
Start: 2025-08-27 | End: 2025-08-27

## 2025-08-27 RX ORDER — SODIUM CHLORIDE 0.9 % (FLUSH) 0.9 %
5-40 SYRINGE (ML) INJECTION PRN
Status: DISCONTINUED | OUTPATIENT
Start: 2025-08-27 | End: 2025-08-29 | Stop reason: HOSPADM

## 2025-08-27 RX ORDER — DOCUSATE SODIUM 100 MG/1
100 CAPSULE, LIQUID FILLED ORAL 2 TIMES DAILY
Status: DISCONTINUED | OUTPATIENT
Start: 2025-08-27 | End: 2025-08-29 | Stop reason: HOSPADM

## 2025-08-27 RX ORDER — LOPERAMIDE HYDROCHLORIDE 2 MG/1
2 CAPSULE ORAL PRN
Status: DISCONTINUED | OUTPATIENT
Start: 2025-08-27 | End: 2025-08-29 | Stop reason: HOSPADM

## 2025-08-27 RX ORDER — LOPERAMIDE HYDROCHLORIDE 2 MG/1
2 CAPSULE ORAL PRN
Status: DISCONTINUED | OUTPATIENT
Start: 2025-08-27 | End: 2025-08-27

## 2025-08-27 RX ORDER — SODIUM CHLORIDE 9 MG/ML
25 INJECTION, SOLUTION INTRAVENOUS PRN
Status: DISCONTINUED | OUTPATIENT
Start: 2025-08-27 | End: 2025-08-27

## 2025-08-27 RX ORDER — SODIUM CHLORIDE, SODIUM LACTATE, POTASSIUM CHLORIDE, CALCIUM CHLORIDE 600; 310; 30; 20 MG/100ML; MG/100ML; MG/100ML; MG/100ML
INJECTION, SOLUTION INTRAVENOUS CONTINUOUS
Status: DISCONTINUED | OUTPATIENT
Start: 2025-08-27 | End: 2025-08-27

## 2025-08-27 RX ORDER — SODIUM CHLORIDE 0.9 % (FLUSH) 0.9 %
5-40 SYRINGE (ML) INJECTION PRN
Status: DISCONTINUED | OUTPATIENT
Start: 2025-08-27 | End: 2025-08-27

## 2025-08-27 RX ORDER — TRANEXAMIC ACID 10 MG/ML
1000 INJECTION, SOLUTION INTRAVENOUS
Status: DISCONTINUED | OUTPATIENT
Start: 2025-08-27 | End: 2025-08-27

## 2025-08-27 RX ORDER — FERROUS SULFATE 325(65) MG
325 TABLET ORAL 2 TIMES DAILY WITH MEALS
Status: DISCONTINUED | OUTPATIENT
Start: 2025-08-28 | End: 2025-08-29 | Stop reason: HOSPADM

## 2025-08-27 RX ADMIN — Medication 15 ML/HR: at 12:39

## 2025-08-27 RX ADMIN — SODIUM CHLORIDE, SODIUM LACTATE, POTASSIUM CHLORIDE, AND CALCIUM CHLORIDE 500 ML: .6; .31; .03; .02 INJECTION, SOLUTION INTRAVENOUS at 11:47

## 2025-08-27 RX ADMIN — SODIUM CHLORIDE, SODIUM LACTATE, POTASSIUM CHLORIDE, AND CALCIUM CHLORIDE: .6; .31; .03; .02 INJECTION, SOLUTION INTRAVENOUS at 07:49

## 2025-08-27 RX ADMIN — Medication 15 ML: at 12:33

## 2025-08-27 RX ADMIN — ONDANSETRON 4 MG: 4 TABLET, ORALLY DISINTEGRATING ORAL at 19:56

## 2025-08-27 RX ADMIN — IBUPROFEN 800 MG: 800 TABLET, FILM COATED ORAL at 18:08

## 2025-08-27 RX ADMIN — OXYTOCIN 2 MILLI-UNITS/MIN: 10 INJECTION, SOLUTION INTRAMUSCULAR; INTRAVENOUS at 08:17

## 2025-08-27 RX ADMIN — DOCUSATE SODIUM 100 MG: 100 CAPSULE, LIQUID FILLED ORAL at 19:58

## 2025-08-27 RX ADMIN — ACETAMINOPHEN 1000 MG: 500 TABLET ORAL at 23:26

## 2025-08-27 ASSESSMENT — LIFESTYLE VARIABLES: SMOKING_STATUS: 0

## 2025-08-27 ASSESSMENT — PAIN DESCRIPTION - DESCRIPTORS
DESCRIPTORS: CRAMPING
DESCRIPTORS: ACHING

## 2025-08-27 ASSESSMENT — PAIN SCALES - GENERAL
PAINLEVEL_OUTOF10: 2
PAINLEVEL_OUTOF10: 4

## 2025-08-27 ASSESSMENT — PAIN - FUNCTIONAL ASSESSMENT
PAIN_FUNCTIONAL_ASSESSMENT: 0-10
PAIN_FUNCTIONAL_ASSESSMENT: ACTIVITIES ARE NOT PREVENTED
PAIN_FUNCTIONAL_ASSESSMENT: 0-10

## 2025-08-27 ASSESSMENT — ENCOUNTER SYMPTOMS: SHORTNESS OF BREATH: 0

## 2025-08-27 ASSESSMENT — PAIN DESCRIPTION - LOCATION
LOCATION: HEAD
LOCATION: ABDOMEN

## 2025-08-28 LAB
HCT VFR BLD AUTO: 25.8 % (ref 34–48)
HGB BLD-MCNC: 8.2 G/DL (ref 11.5–15.5)

## 2025-08-28 PROCEDURE — 85014 HEMATOCRIT: CPT

## 2025-08-28 PROCEDURE — 85018 HEMOGLOBIN: CPT

## 2025-08-28 PROCEDURE — 6370000000 HC RX 637 (ALT 250 FOR IP): Performed by: OBSTETRICS & GYNECOLOGY

## 2025-08-28 PROCEDURE — 1220000001 HC SEMI PRIVATE L&D R&B

## 2025-08-28 RX ORDER — IBUPROFEN 800 MG/1
800 TABLET, FILM COATED ORAL EVERY 8 HOURS PRN
Qty: 120 TABLET | Refills: 0 | Status: SHIPPED | OUTPATIENT
Start: 2025-08-28

## 2025-08-28 RX ADMIN — FERROUS SULFATE TAB 325 MG (65 MG ELEMENTAL FE) 325 MG: 325 (65 FE) TAB at 08:10

## 2025-08-28 RX ADMIN — FERROUS SULFATE TAB 325 MG (65 MG ELEMENTAL FE) 325 MG: 325 (65 FE) TAB at 16:09

## 2025-08-28 RX ADMIN — IBUPROFEN 800 MG: 800 TABLET, FILM COATED ORAL at 16:08

## 2025-08-28 RX ADMIN — IBUPROFEN 800 MG: 800 TABLET, FILM COATED ORAL at 08:09

## 2025-08-28 RX ADMIN — DOCUSATE SODIUM 100 MG: 100 CAPSULE, LIQUID FILLED ORAL at 08:10

## 2025-08-28 RX ADMIN — DOCUSATE SODIUM 100 MG: 100 CAPSULE, LIQUID FILLED ORAL at 20:00

## 2025-08-28 ASSESSMENT — PAIN - FUNCTIONAL ASSESSMENT
PAIN_FUNCTIONAL_ASSESSMENT: 0-10
PAIN_FUNCTIONAL_ASSESSMENT: 0-10

## 2025-08-28 ASSESSMENT — PAIN DESCRIPTION - LOCATION
LOCATION: GROIN
LOCATION: ABDOMEN

## 2025-08-28 ASSESSMENT — PAIN SCALES - GENERAL
PAINLEVEL_OUTOF10: 4
PAINLEVEL_OUTOF10: 4

## 2025-08-28 ASSESSMENT — PAIN DESCRIPTION - DESCRIPTORS
DESCRIPTORS: CRAMPING;ACHING
DESCRIPTORS: CRAMPING;BURNING

## 2025-08-29 VITALS
BODY MASS INDEX: 25.27 KG/M2 | OXYGEN SATURATION: 98 % | HEART RATE: 66 BPM | SYSTOLIC BLOOD PRESSURE: 134 MMHG | DIASTOLIC BLOOD PRESSURE: 85 MMHG | TEMPERATURE: 97.9 F | HEIGHT: 67 IN | RESPIRATION RATE: 16 BRPM | WEIGHT: 161 LBS

## 2025-08-29 PROCEDURE — 6370000000 HC RX 637 (ALT 250 FOR IP): Performed by: OBSTETRICS & GYNECOLOGY

## 2025-08-29 RX ADMIN — DOCUSATE SODIUM 100 MG: 100 CAPSULE, LIQUID FILLED ORAL at 08:11

## 2025-08-29 RX ADMIN — ACETAMINOPHEN 1000 MG: 500 TABLET ORAL at 00:15

## 2025-08-29 RX ADMIN — FERROUS SULFATE TAB 325 MG (65 MG ELEMENTAL FE) 325 MG: 325 (65 FE) TAB at 08:10

## 2025-08-29 ASSESSMENT — PAIN DESCRIPTION - LOCATION
LOCATION: HEAD
LOCATION: ABDOMEN;HEAD

## 2025-08-29 ASSESSMENT — PAIN SCALES - GENERAL
PAINLEVEL_OUTOF10: 4
PAINLEVEL_OUTOF10: 4

## 2025-08-29 ASSESSMENT — PAIN DESCRIPTION - DESCRIPTORS: DESCRIPTORS: ACHING;DISCOMFORT

## 2025-09-02 ENCOUNTER — HOSPITAL ENCOUNTER (INPATIENT)
Age: 30
LOS: 1 days | Discharge: HOME | DRG: 561 | End: 2025-09-03
Attending: EMERGENCY MEDICINE | Admitting: OBSTETRICS & GYNECOLOGY
Payer: MEDICAID

## 2025-09-02 LAB
ALBUMIN SERPL-MCNC: 3.9 G/DL (ref 3.5–5.2)
ALP SERPL-CCNC: 122 U/L (ref 35–104)
ALT SERPL-CCNC: 26 U/L (ref 0–35)
ANION GAP SERPL CALCULATED.3IONS-SCNC: 14 MMOL/L (ref 7–16)
AST SERPL-CCNC: 25 U/L (ref 0–35)
BASOPHILS # BLD: 0.04 K/UL (ref 0–0.2)
BASOPHILS NFR BLD: 1 % (ref 0–2)
BILIRUB SERPL-MCNC: 0.6 MG/DL (ref 0–1.2)
BILIRUB UR QL STRIP: NEGATIVE
BUN SERPL-MCNC: 8 MG/DL (ref 6–20)
CALCIUM SERPL-MCNC: 9.2 MG/DL (ref 8.6–10)
CHLORIDE SERPL-SCNC: 110 MMOL/L (ref 98–107)
CLARITY UR: CLEAR
CO2 SERPL-SCNC: 20 MMOL/L (ref 22–29)
COLOR UR: YELLOW
CREAT SERPL-MCNC: 0.5 MG/DL (ref 0.5–1)
CREAT UR-MCNC: 10.3 MG/DL (ref 29–226)
EKG ATRIAL RATE: 73 BPM
EKG P AXIS: 62 DEGREES
EKG P-R INTERVAL: 160 MS
EKG Q-T INTERVAL: 388 MS
EKG QRS DURATION: 80 MS
EKG QTC CALCULATION (BAZETT): 427 MS
EKG R AXIS: 45 DEGREES
EKG T AXIS: 45 DEGREES
EKG VENTRICULAR RATE: 73 BPM
EOSINOPHIL # BLD: 0.23 K/UL (ref 0.05–0.5)
EOSINOPHILS RELATIVE PERCENT: 3 % (ref 0–6)
ERYTHROCYTE [DISTWIDTH] IN BLOOD BY AUTOMATED COUNT: 13.9 % (ref 11.5–15)
GFR, ESTIMATED: >90 ML/MIN/1.73M2
GLUCOSE SERPL-MCNC: 92 MG/DL (ref 74–99)
GLUCOSE UR STRIP-MCNC: NEGATIVE MG/DL
HCT VFR BLD AUTO: 29.8 % (ref 34–48)
HGB BLD-MCNC: 9.7 G/DL (ref 11.5–15.5)
HGB UR QL STRIP.AUTO: ABNORMAL
IMM GRANULOCYTES # BLD AUTO: 0.04 K/UL (ref 0–0.58)
IMM GRANULOCYTES NFR BLD: 1 % (ref 0–5)
KETONES UR STRIP-MCNC: 15 MG/DL
LACTATE BLDV-SCNC: 0.6 MMOL/L (ref 0.5–2.2)
LEUKOCYTE ESTERASE UR QL STRIP: NEGATIVE
LYMPHOCYTES NFR BLD: 1.32 K/UL (ref 1.5–4)
LYMPHOCYTES RELATIVE PERCENT: 19 % (ref 20–42)
MAGNESIUM SERPL-MCNC: 1.7 MG/DL (ref 1.6–2.6)
MCH RBC QN AUTO: 27.4 PG (ref 26–35)
MCHC RBC AUTO-ENTMCNC: 32.6 G/DL (ref 32–34.5)
MCV RBC AUTO: 84.2 FL (ref 80–99.9)
MONOCYTES NFR BLD: 0.38 K/UL (ref 0.1–0.95)
MONOCYTES NFR BLD: 5 % (ref 2–12)
NEUTROPHILS NFR BLD: 71 % (ref 43–80)
NEUTS SEG NFR BLD: 5.02 K/UL (ref 1.8–7.3)
NITRITE UR QL STRIP: NEGATIVE
PH UR STRIP: 6 [PH] (ref 5–8)
PLATELET # BLD AUTO: 227 K/UL (ref 130–450)
PMV BLD AUTO: 11.4 FL (ref 7–12)
POTASSIUM SERPL-SCNC: 3.3 MMOL/L (ref 3.5–5.1)
PROT SERPL-MCNC: 6.4 G/DL (ref 6.4–8.3)
PROT UR STRIP-MCNC: NEGATIVE MG/DL
RBC # BLD AUTO: 3.54 M/UL (ref 3.5–5.5)
RBC #/AREA URNS HPF: NORMAL /HPF
SODIUM SERPL-SCNC: 144 MMOL/L (ref 136–145)
SP GR UR STRIP: <1.005 (ref 1–1.03)
T PALLIDUM AB SER QL IA: NONREACTIVE
TOTAL PROTEIN, URINE: <6 MG/DL (ref 0–12)
URINE TOTAL PROTEIN CREATININE RATIO: <0.58 (ref 0–0.2)
UROBILINOGEN UR STRIP-ACNC: 0.2 EU/DL (ref 0–1)
WBC #/AREA URNS HPF: NORMAL /HPF
WBC OTHER # BLD: 7 K/UL (ref 4.5–11.5)

## 2025-09-02 PROCEDURE — 81001 URINALYSIS AUTO W/SCOPE: CPT

## 2025-09-02 PROCEDURE — 83735 ASSAY OF MAGNESIUM: CPT

## 2025-09-02 PROCEDURE — 83605 ASSAY OF LACTIC ACID: CPT

## 2025-09-02 PROCEDURE — 2580000003 HC RX 258

## 2025-09-02 PROCEDURE — 87086 URINE CULTURE/COLONY COUNT: CPT

## 2025-09-02 PROCEDURE — 86780 TREPONEMA PALLIDUM: CPT

## 2025-09-02 PROCEDURE — G0378 HOSPITAL OBSERVATION PER HR: HCPCS

## 2025-09-02 PROCEDURE — 85025 COMPLETE CBC W/AUTO DIFF WBC: CPT

## 2025-09-02 PROCEDURE — 99285 EMERGENCY DEPT VISIT HI MDM: CPT

## 2025-09-02 PROCEDURE — 96365 THER/PROPH/DIAG IV INF INIT: CPT

## 2025-09-02 PROCEDURE — 6360000002 HC RX W HCPCS: Performed by: EMERGENCY MEDICINE

## 2025-09-02 PROCEDURE — 82570 ASSAY OF URINE CREATININE: CPT

## 2025-09-02 PROCEDURE — 84156 ASSAY OF PROTEIN URINE: CPT

## 2025-09-02 PROCEDURE — 6370000000 HC RX 637 (ALT 250 FOR IP): Performed by: OBSTETRICS & GYNECOLOGY

## 2025-09-02 PROCEDURE — 80053 COMPREHEN METABOLIC PANEL: CPT

## 2025-09-02 RX ORDER — SODIUM CHLORIDE 0.9 % (FLUSH) 0.9 %
5-40 SYRINGE (ML) INJECTION PRN
Status: DISCONTINUED | OUTPATIENT
Start: 2025-09-02 | End: 2025-09-03 | Stop reason: HOSPADM

## 2025-09-02 RX ORDER — ACETAMINOPHEN 500 MG
1000 TABLET ORAL EVERY 8 HOURS PRN
Status: DISCONTINUED | OUTPATIENT
Start: 2025-09-02 | End: 2025-09-03 | Stop reason: HOSPADM

## 2025-09-02 RX ORDER — SODIUM CHLORIDE 9 MG/ML
INJECTION, SOLUTION INTRAVENOUS PRN
Status: DISCONTINUED | OUTPATIENT
Start: 2025-09-02 | End: 2025-09-03 | Stop reason: HOSPADM

## 2025-09-02 RX ORDER — SODIUM CHLORIDE, SODIUM LACTATE, POTASSIUM CHLORIDE, CALCIUM CHLORIDE 600; 310; 30; 20 MG/100ML; MG/100ML; MG/100ML; MG/100ML
INJECTION, SOLUTION INTRAVENOUS
Status: COMPLETED
Start: 2025-09-02 | End: 2025-09-02

## 2025-09-02 RX ORDER — LABETALOL HYDROCHLORIDE 5 MG/ML
20 INJECTION, SOLUTION INTRAVENOUS
Status: DISCONTINUED | OUTPATIENT
Start: 2025-09-02 | End: 2025-09-03 | Stop reason: HOSPADM

## 2025-09-02 RX ORDER — CALCIUM GLUCONATE 98 MG/ML
1000 INJECTION, SOLUTION INTRAVENOUS PRN
Status: DISCONTINUED | OUTPATIENT
Start: 2025-09-02 | End: 2025-09-03 | Stop reason: HOSPADM

## 2025-09-02 RX ORDER — SODIUM CHLORIDE 0.9 % (FLUSH) 0.9 %
5-40 SYRINGE (ML) INJECTION EVERY 12 HOURS SCHEDULED
Status: DISCONTINUED | OUTPATIENT
Start: 2025-09-02 | End: 2025-09-03 | Stop reason: HOSPADM

## 2025-09-02 RX ORDER — SODIUM CHLORIDE, SODIUM LACTATE, POTASSIUM CHLORIDE, CALCIUM CHLORIDE 600; 310; 30; 20 MG/100ML; MG/100ML; MG/100ML; MG/100ML
INJECTION, SOLUTION INTRAVENOUS CONTINUOUS
Status: DISCONTINUED | OUTPATIENT
Start: 2025-09-02 | End: 2025-09-03 | Stop reason: HOSPADM

## 2025-09-02 RX ORDER — HYDRALAZINE HYDROCHLORIDE 20 MG/ML
10 INJECTION INTRAMUSCULAR; INTRAVENOUS
Status: DISCONTINUED | OUTPATIENT
Start: 2025-09-02 | End: 2025-09-03 | Stop reason: HOSPADM

## 2025-09-02 RX ORDER — LABETALOL HYDROCHLORIDE 5 MG/ML
40 INJECTION, SOLUTION INTRAVENOUS
Status: DISCONTINUED | OUTPATIENT
Start: 2025-09-02 | End: 2025-09-03 | Stop reason: HOSPADM

## 2025-09-02 RX ORDER — LABETALOL HYDROCHLORIDE 5 MG/ML
80 INJECTION, SOLUTION INTRAVENOUS
Status: DISCONTINUED | OUTPATIENT
Start: 2025-09-02 | End: 2025-09-03 | Stop reason: HOSPADM

## 2025-09-02 RX ORDER — ONDANSETRON 4 MG/1
4 TABLET, ORALLY DISINTEGRATING ORAL EVERY 6 HOURS PRN
Status: DISCONTINUED | OUTPATIENT
Start: 2025-09-02 | End: 2025-09-03 | Stop reason: HOSPADM

## 2025-09-02 RX ORDER — ONDANSETRON 2 MG/ML
4 INJECTION INTRAMUSCULAR; INTRAVENOUS EVERY 6 HOURS PRN
Status: DISCONTINUED | OUTPATIENT
Start: 2025-09-02 | End: 2025-09-03 | Stop reason: HOSPADM

## 2025-09-02 RX ORDER — MAGNESIUM SULFATE IN WATER 40 MG/ML
4000 INJECTION, SOLUTION INTRAVENOUS ONCE
Status: COMPLETED | OUTPATIENT
Start: 2025-09-02 | End: 2025-09-02

## 2025-09-02 RX ADMIN — SODIUM CHLORIDE, SODIUM LACTATE, POTASSIUM CHLORIDE, AND CALCIUM CHLORIDE: .6; .31; .03; .02 INJECTION, SOLUTION INTRAVENOUS at 08:48

## 2025-09-02 RX ADMIN — MAGNESIUM SULFATE HEPTAHYDRATE 2000 MG/HR: 40 INJECTION, SOLUTION INTRAVENOUS at 18:35

## 2025-09-02 RX ADMIN — MAGNESIUM SULFATE HEPTAHYDRATE 2000 MG/HR: 40 INJECTION, SOLUTION INTRAVENOUS at 08:46

## 2025-09-02 RX ADMIN — ACETAMINOPHEN 1000 MG: 500 TABLET ORAL at 13:21

## 2025-09-02 RX ADMIN — MAGNESIUM SULFATE IN WATER 4000 MG: 40 INJECTION, SOLUTION INTRAVENOUS at 07:57

## 2025-09-02 RX ADMIN — SODIUM CHLORIDE, POTASSIUM CHLORIDE, SODIUM LACTATE AND CALCIUM CHLORIDE: 600; 310; 30; 20 INJECTION, SOLUTION INTRAVENOUS at 08:48

## 2025-09-02 RX ADMIN — ACETAMINOPHEN 1000 MG: 500 TABLET ORAL at 21:11

## 2025-09-02 ASSESSMENT — PAIN DESCRIPTION - LOCATION
LOCATION: HEAD
LOCATION: ABDOMEN

## 2025-09-02 ASSESSMENT — PAIN DESCRIPTION - DESCRIPTORS
DESCRIPTORS: CRAMPING
DESCRIPTORS: DISCOMFORT;NAGGING

## 2025-09-02 ASSESSMENT — PAIN SCALES - GENERAL
PAINLEVEL_OUTOF10: 7
PAINLEVEL_OUTOF10: 2

## 2025-09-02 ASSESSMENT — PAIN - FUNCTIONAL ASSESSMENT
PAIN_FUNCTIONAL_ASSESSMENT: 0-10
PAIN_FUNCTIONAL_ASSESSMENT: 0-10

## 2025-09-03 VITALS
RESPIRATION RATE: 16 BRPM | DIASTOLIC BLOOD PRESSURE: 88 MMHG | OXYGEN SATURATION: 99 % | TEMPERATURE: 98.4 F | SYSTOLIC BLOOD PRESSURE: 137 MMHG | HEART RATE: 64 BPM

## 2025-09-03 LAB
MICROORGANISM SPEC CULT: ABNORMAL
MICROORGANISM SPEC CULT: ABNORMAL
SPECIMEN DESCRIPTION: ABNORMAL

## 2025-09-03 PROCEDURE — 2580000003 HC RX 258: Performed by: EMERGENCY MEDICINE

## 2025-09-03 PROCEDURE — 2500000003 HC RX 250 WO HCPCS: Performed by: EMERGENCY MEDICINE

## 2025-09-03 PROCEDURE — 6370000000 HC RX 637 (ALT 250 FOR IP): Performed by: OBSTETRICS & GYNECOLOGY

## 2025-09-03 PROCEDURE — 1220000001 HC SEMI PRIVATE L&D R&B

## 2025-09-03 PROCEDURE — 6360000002 HC RX W HCPCS: Performed by: EMERGENCY MEDICINE

## 2025-09-03 RX ADMIN — ONDANSETRON 4 MG: 2 INJECTION, SOLUTION INTRAMUSCULAR; INTRAVENOUS at 11:16

## 2025-09-03 RX ADMIN — ACETAMINOPHEN 1000 MG: 500 TABLET ORAL at 08:02

## 2025-09-03 RX ADMIN — ACETAMINOPHEN 1000 MG: 500 TABLET ORAL at 16:16

## 2025-09-03 RX ADMIN — SODIUM CHLORIDE, POTASSIUM CHLORIDE, SODIUM LACTATE AND CALCIUM CHLORIDE 125 ML: 600; 310; 30; 20 INJECTION, SOLUTION INTRAVENOUS at 09:27

## 2025-09-03 RX ADMIN — SODIUM CHLORIDE, PRESERVATIVE FREE 10 ML: 5 INJECTION INTRAVENOUS at 09:26

## 2025-09-03 ASSESSMENT — PAIN - FUNCTIONAL ASSESSMENT
PAIN_FUNCTIONAL_ASSESSMENT: 0-10
PAIN_FUNCTIONAL_ASSESSMENT: ACTIVITIES ARE NOT PREVENTED
PAIN_FUNCTIONAL_ASSESSMENT: ACTIVITIES ARE NOT PREVENTED
PAIN_FUNCTIONAL_ASSESSMENT: 0-10

## 2025-09-03 ASSESSMENT — PAIN DESCRIPTION - LOCATION
LOCATION: HEAD
LOCATION: HEAD

## 2025-09-03 ASSESSMENT — PAIN DESCRIPTION - DESCRIPTORS
DESCRIPTORS: ACHING;CRAMPING
DESCRIPTORS: DULL

## 2025-09-03 ASSESSMENT — PAIN DESCRIPTION - ORIENTATION: ORIENTATION: LOWER

## 2025-09-03 ASSESSMENT — PAIN SCALES - GENERAL
PAINLEVEL_OUTOF10: 2
PAINLEVEL_OUTOF10: 8

## 2025-09-05 ENCOUNTER — HOSPITAL ENCOUNTER (EMERGENCY)
Age: 30
Discharge: HOME | End: 2025-09-05
Attending: EMERGENCY MEDICINE
Payer: MEDICAID

## 2025-09-05 VITALS
RESPIRATION RATE: 20 BRPM | SYSTOLIC BLOOD PRESSURE: 120 MMHG | BODY MASS INDEX: 25.22 KG/M2 | WEIGHT: 161 LBS | DIASTOLIC BLOOD PRESSURE: 76 MMHG | OXYGEN SATURATION: 99 % | HEART RATE: 77 BPM | TEMPERATURE: 98.4 F

## 2025-09-05 DIAGNOSIS — T78.40XA ALLERGIC REACTION TO DRUG, INITIAL ENCOUNTER: Primary | ICD-10-CM

## 2025-09-05 PROCEDURE — 2500000003 HC RX 250 WO HCPCS: Performed by: EMERGENCY MEDICINE

## 2025-09-05 PROCEDURE — 6360000002 HC RX W HCPCS: Performed by: EMERGENCY MEDICINE

## 2025-09-05 PROCEDURE — 6370000000 HC RX 637 (ALT 250 FOR IP): Performed by: EMERGENCY MEDICINE

## 2025-09-05 RX ORDER — DIPHENHYDRAMINE HYDROCHLORIDE 50 MG/ML
25 INJECTION, SOLUTION INTRAMUSCULAR; INTRAVENOUS ONCE
Status: COMPLETED | OUTPATIENT
Start: 2025-09-05 | End: 2025-09-05

## 2025-09-05 RX ORDER — LABETALOL 200 MG/1
100 TABLET, FILM COATED ORAL ONCE
Status: COMPLETED | OUTPATIENT
Start: 2025-09-05 | End: 2025-09-05

## 2025-09-05 RX ORDER — LABETALOL 100 MG/1
100 TABLET, FILM COATED ORAL 2 TIMES DAILY
Qty: 60 TABLET | Refills: 0 | Status: SHIPPED | OUTPATIENT
Start: 2025-09-05

## 2025-09-05 RX ADMIN — WATER 125 MG: 1 INJECTION INTRAMUSCULAR; INTRAVENOUS; SUBCUTANEOUS at 15:25

## 2025-09-05 RX ADMIN — DIPHENHYDRAMINE HYDROCHLORIDE 25 MG: 50 INJECTION INTRAMUSCULAR; INTRAVENOUS at 15:24

## 2025-09-05 RX ADMIN — LABETALOL HYDROCHLORIDE 100 MG: 200 TABLET, FILM COATED ORAL at 15:25

## 2025-09-05 RX ADMIN — FAMOTIDINE 20 MG: 10 INJECTION, SOLUTION INTRAVENOUS at 15:25

## 2025-09-05 ASSESSMENT — LIFESTYLE VARIABLES
HOW OFTEN DO YOU HAVE A DRINK CONTAINING ALCOHOL: NEVER
HOW MANY STANDARD DRINKS CONTAINING ALCOHOL DO YOU HAVE ON A TYPICAL DAY: PATIENT DOES NOT DRINK